# Patient Record
Sex: FEMALE | Race: OTHER | Employment: UNEMPLOYED | URBAN - METROPOLITAN AREA
[De-identification: names, ages, dates, MRNs, and addresses within clinical notes are randomized per-mention and may not be internally consistent; named-entity substitution may affect disease eponyms.]

---

## 2019-02-06 ENCOUNTER — OFFICE VISIT (OUTPATIENT)
Dept: FAMILY MEDICINE CLINIC | Age: 49
End: 2019-02-06
Payer: COMMERCIAL

## 2019-02-06 VITALS
OXYGEN SATURATION: 98 % | BODY MASS INDEX: 28.28 KG/M2 | DIASTOLIC BLOOD PRESSURE: 86 MMHG | WEIGHT: 180.2 LBS | RESPIRATION RATE: 15 BRPM | HEART RATE: 72 BPM | HEIGHT: 67 IN | SYSTOLIC BLOOD PRESSURE: 138 MMHG | TEMPERATURE: 97.4 F

## 2019-02-06 DIAGNOSIS — Z90.49 HISTORY OF COLECTOMY: ICD-10-CM

## 2019-02-06 DIAGNOSIS — Z13.0 SCREENING FOR BLOOD DISEASE: ICD-10-CM

## 2019-02-06 DIAGNOSIS — M54.2 CHRONIC NECK PAIN: ICD-10-CM

## 2019-02-06 DIAGNOSIS — G89.29 CHRONIC LOW BACK PAIN, UNSPECIFIED BACK PAIN LATERALITY, WITH SCIATICA PRESENCE UNSPECIFIED: ICD-10-CM

## 2019-02-06 DIAGNOSIS — G89.29 CHRONIC NECK PAIN: ICD-10-CM

## 2019-02-06 DIAGNOSIS — I10 ESSENTIAL HYPERTENSION: Primary | ICD-10-CM

## 2019-02-06 DIAGNOSIS — M54.5 CHRONIC LOW BACK PAIN, UNSPECIFIED BACK PAIN LATERALITY, WITH SCIATICA PRESENCE UNSPECIFIED: ICD-10-CM

## 2019-02-06 DIAGNOSIS — Z85.038 H/O COLON CANCER, STAGE II: ICD-10-CM

## 2019-02-06 DIAGNOSIS — G47.30 SLEEP APNEA, UNSPECIFIED TYPE: ICD-10-CM

## 2019-02-06 LAB
ALBUMIN SERPL-MCNC: 4.4 G/DL (ref 3.9–4.9)
ALP BLD-CCNC: 68 U/L (ref 40–130)
ALT SERPL-CCNC: 11 U/L (ref 0–33)
ANION GAP SERPL CALCULATED.3IONS-SCNC: 14 MEQ/L (ref 7–13)
AST SERPL-CCNC: 15 U/L (ref 0–35)
BASOPHILS ABSOLUTE: 0.1 K/UL (ref 0–0.2)
BASOPHILS RELATIVE PERCENT: 0.8 %
BILIRUB SERPL-MCNC: 0.7 MG/DL (ref 0–1.2)
BUN BLDV-MCNC: 9 MG/DL (ref 6–20)
CALCIUM SERPL-MCNC: 9.5 MG/DL (ref 8.6–10.2)
CEA: 1.8 NG/ML (ref 0–5.5)
CHLORIDE BLD-SCNC: 103 MEQ/L (ref 98–107)
CHOLESTEROL, TOTAL: 166 MG/DL (ref 0–199)
CO2: 23 MEQ/L (ref 22–29)
CREAT SERPL-MCNC: 0.43 MG/DL (ref 0.5–0.9)
EOSINOPHILS ABSOLUTE: 0.2 K/UL (ref 0–0.7)
EOSINOPHILS RELATIVE PERCENT: 2.2 %
GFR AFRICAN AMERICAN: >60
GFR NON-AFRICAN AMERICAN: >60
GLOBULIN: 3 G/DL (ref 2.3–3.5)
GLUCOSE BLD-MCNC: 93 MG/DL (ref 74–109)
HCT VFR BLD CALC: 39.7 % (ref 37–47)
HDLC SERPL-MCNC: 53 MG/DL (ref 40–59)
HEMOGLOBIN: 13.6 G/DL (ref 12–16)
LDL CHOLESTEROL CALCULATED: 98 MG/DL (ref 0–129)
LYMPHOCYTES ABSOLUTE: 2.5 K/UL (ref 1–4.8)
LYMPHOCYTES RELATIVE PERCENT: 32 %
MCH RBC QN AUTO: 30.6 PG (ref 27–31.3)
MCHC RBC AUTO-ENTMCNC: 34.4 % (ref 33–37)
MCV RBC AUTO: 89 FL (ref 82–100)
MONOCYTES ABSOLUTE: 0.6 K/UL (ref 0.2–0.8)
MONOCYTES RELATIVE PERCENT: 7.4 %
NEUTROPHILS ABSOLUTE: 4.4 K/UL (ref 1.4–6.5)
NEUTROPHILS RELATIVE PERCENT: 57.6 %
PDW BLD-RTO: 13.5 % (ref 11.5–14.5)
PLATELET # BLD: 280 K/UL (ref 130–400)
POTASSIUM SERPL-SCNC: 4.2 MEQ/L (ref 3.5–5.1)
RBC # BLD: 4.46 M/UL (ref 4.2–5.4)
SODIUM BLD-SCNC: 140 MEQ/L (ref 132–144)
TOTAL PROTEIN: 7.4 G/DL (ref 6.4–8.1)
TRIGL SERPL-MCNC: 76 MG/DL (ref 0–200)
TSH REFLEX: 0.75 UIU/ML (ref 0.27–4.2)
VITAMIN D 25-HYDROXY: 36 NG/ML (ref 30–100)
WBC # BLD: 7.7 K/UL (ref 4.8–10.8)

## 2019-02-06 PROCEDURE — 99204 OFFICE O/P NEW MOD 45 MIN: CPT | Performed by: INTERNAL MEDICINE

## 2019-02-06 RX ORDER — AMLODIPINE BESYLATE 2.5 MG/1
2.5 TABLET ORAL DAILY
Qty: 30 TABLET | Refills: 0 | Status: SHIPPED | OUTPATIENT
Start: 2019-02-06 | End: 2019-02-14 | Stop reason: DRUGHIGH

## 2019-02-06 ASSESSMENT — PATIENT HEALTH QUESTIONNAIRE - PHQ9
SUM OF ALL RESPONSES TO PHQ9 QUESTIONS 1 & 2: 0
SUM OF ALL RESPONSES TO PHQ QUESTIONS 1-9: 0
SUM OF ALL RESPONSES TO PHQ QUESTIONS 1-9: 0
1. LITTLE INTEREST OR PLEASURE IN DOING THINGS: 0
2. FEELING DOWN, DEPRESSED OR HOPELESS: 0

## 2019-02-06 ASSESSMENT — ENCOUNTER SYMPTOMS
EYE PAIN: 0
SHORTNESS OF BREATH: 0
BACK PAIN: 1
ABDOMINAL PAIN: 0

## 2019-02-11 DIAGNOSIS — G89.29 CHRONIC LOW BACK PAIN, UNSPECIFIED BACK PAIN LATERALITY, WITH SCIATICA PRESENCE UNSPECIFIED: Primary | ICD-10-CM

## 2019-02-11 DIAGNOSIS — M54.2 CHRONIC NECK PAIN: ICD-10-CM

## 2019-02-11 DIAGNOSIS — G89.29 CHRONIC NECK PAIN: ICD-10-CM

## 2019-02-11 DIAGNOSIS — M54.5 CHRONIC LOW BACK PAIN, UNSPECIFIED BACK PAIN LATERALITY, WITH SCIATICA PRESENCE UNSPECIFIED: Primary | ICD-10-CM

## 2019-02-13 ENCOUNTER — OFFICE VISIT (OUTPATIENT)
Dept: GASTROENTEROLOGY | Age: 49
End: 2019-02-13
Payer: COMMERCIAL

## 2019-02-13 VITALS
HEART RATE: 77 BPM | TEMPERATURE: 98 F | BODY MASS INDEX: 27.28 KG/M2 | SYSTOLIC BLOOD PRESSURE: 140 MMHG | HEIGHT: 68 IN | DIASTOLIC BLOOD PRESSURE: 74 MMHG | WEIGHT: 180 LBS | OXYGEN SATURATION: 99 %

## 2019-02-13 DIAGNOSIS — R13.19 ESOPHAGEAL DYSPHAGIA: Primary | ICD-10-CM

## 2019-02-13 DIAGNOSIS — Z85.038 HISTORY OF COLON CANCER: ICD-10-CM

## 2019-02-13 PROCEDURE — 99204 OFFICE O/P NEW MOD 45 MIN: CPT | Performed by: INTERNAL MEDICINE

## 2019-02-13 RX ORDER — SODIUM, POTASSIUM,MAG SULFATES 17.5-3.13G
SOLUTION, RECONSTITUTED, ORAL ORAL
Qty: 1 BOTTLE | Refills: 0 | Status: SHIPPED | OUTPATIENT
Start: 2019-02-13 | End: 2019-03-28 | Stop reason: ALTCHOICE

## 2019-02-13 RX ORDER — OMEPRAZOLE 40 MG/1
40 CAPSULE, DELAYED RELEASE ORAL DAILY
Qty: 30 CAPSULE | Refills: 3 | Status: SHIPPED
Start: 2019-02-13 | End: 2020-04-02 | Stop reason: CLARIF

## 2019-02-14 ENCOUNTER — OFFICE VISIT (OUTPATIENT)
Dept: FAMILY MEDICINE CLINIC | Age: 49
End: 2019-02-14
Payer: COMMERCIAL

## 2019-02-14 VITALS
DIASTOLIC BLOOD PRESSURE: 84 MMHG | BODY MASS INDEX: 28.91 KG/M2 | TEMPERATURE: 97.1 F | RESPIRATION RATE: 15 BRPM | OXYGEN SATURATION: 98 % | HEIGHT: 67 IN | HEART RATE: 78 BPM | SYSTOLIC BLOOD PRESSURE: 144 MMHG | WEIGHT: 184.2 LBS

## 2019-02-14 DIAGNOSIS — M50.90 CERVICAL DISC DISEASE: ICD-10-CM

## 2019-02-14 DIAGNOSIS — G89.29 CHRONIC LOW BACK PAIN, UNSPECIFIED BACK PAIN LATERALITY, WITH SCIATICA PRESENCE UNSPECIFIED: Primary | ICD-10-CM

## 2019-02-14 DIAGNOSIS — G89.29 CHRONIC LOW BACK PAIN, UNSPECIFIED BACK PAIN LATERALITY, WITH SCIATICA PRESENCE UNSPECIFIED: ICD-10-CM

## 2019-02-14 DIAGNOSIS — K22.4 ESOPHAGEAL DYSMOTILITY: ICD-10-CM

## 2019-02-14 DIAGNOSIS — H90.5 SENSORINEURAL HEARING LOSS (SNHL) OF RIGHT EAR, UNSPECIFIED HEARING STATUS ON CONTRALATERAL SIDE: ICD-10-CM

## 2019-02-14 DIAGNOSIS — M54.5 CHRONIC LOW BACK PAIN, UNSPECIFIED BACK PAIN LATERALITY, WITH SCIATICA PRESENCE UNSPECIFIED: Primary | ICD-10-CM

## 2019-02-14 DIAGNOSIS — Z85.038 HISTORY OF COLON CANCER: ICD-10-CM

## 2019-02-14 DIAGNOSIS — I10 ESSENTIAL HYPERTENSION: ICD-10-CM

## 2019-02-14 DIAGNOSIS — M54.5 CHRONIC LOW BACK PAIN, UNSPECIFIED BACK PAIN LATERALITY, WITH SCIATICA PRESENCE UNSPECIFIED: ICD-10-CM

## 2019-02-14 PROCEDURE — 99214 OFFICE O/P EST MOD 30 MIN: CPT | Performed by: INTERNAL MEDICINE

## 2019-02-14 RX ORDER — NABUMETONE 500 MG/1
500 TABLET, FILM COATED ORAL 2 TIMES DAILY
Qty: 60 TABLET | Refills: 0 | Status: SHIPPED
Start: 2019-02-14 | End: 2020-04-02 | Stop reason: CLARIF

## 2019-02-14 RX ORDER — NABUMETONE 500 MG/1
500 TABLET, FILM COATED ORAL 2 TIMES DAILY
Qty: 60 TABLET | Refills: 0 | Status: SHIPPED | OUTPATIENT
Start: 2019-02-14 | End: 2019-02-14 | Stop reason: SDUPTHER

## 2019-02-14 RX ORDER — FLUTICASONE PROPIONATE 50 MCG
1 SPRAY, SUSPENSION (ML) NASAL DAILY
Qty: 1 BOTTLE | Refills: 0 | Status: SHIPPED | OUTPATIENT
Start: 2019-02-14 | End: 2019-02-14 | Stop reason: SDUPTHER

## 2019-02-14 RX ORDER — AMLODIPINE BESYLATE 10 MG/1
10 TABLET ORAL DAILY
Qty: 14 TABLET | Refills: 0 | Status: SHIPPED | OUTPATIENT
Start: 2019-02-14 | End: 2019-02-14 | Stop reason: SDUPTHER

## 2019-02-14 RX ORDER — AMLODIPINE BESYLATE 10 MG/1
10 TABLET ORAL DAILY
Qty: 14 TABLET | Refills: 0 | Status: SHIPPED | OUTPATIENT
Start: 2019-02-14 | End: 2019-02-20 | Stop reason: SDUPTHER

## 2019-02-14 RX ORDER — BACLOFEN 10 MG/1
10 TABLET ORAL 2 TIMES DAILY
Qty: 60 TABLET | Refills: 0 | Status: SHIPPED | OUTPATIENT
Start: 2019-02-14 | End: 2019-02-14 | Stop reason: SDUPTHER

## 2019-02-14 RX ORDER — FLUTICASONE PROPIONATE 50 MCG
1 SPRAY, SUSPENSION (ML) NASAL DAILY
Qty: 1 BOTTLE | Refills: 0 | Status: SHIPPED | OUTPATIENT
Start: 2019-02-14 | End: 2020-08-06 | Stop reason: CLARIF

## 2019-02-14 RX ORDER — DOXYCYCLINE HYCLATE 100 MG
100 TABLET ORAL 2 TIMES DAILY
Qty: 14 TABLET | Refills: 0 | Status: SHIPPED | OUTPATIENT
Start: 2019-02-14 | End: 2019-02-14 | Stop reason: SDUPTHER

## 2019-02-14 RX ORDER — BACLOFEN 10 MG/1
10 TABLET ORAL 2 TIMES DAILY
Qty: 60 TABLET | Refills: 0 | Status: SHIPPED | OUTPATIENT
Start: 2019-02-14 | End: 2019-05-31 | Stop reason: SDUPTHER

## 2019-02-14 RX ORDER — DOXYCYCLINE HYCLATE 100 MG
100 TABLET ORAL 2 TIMES DAILY
Qty: 14 TABLET | Refills: 0 | Status: SHIPPED | OUTPATIENT
Start: 2019-02-14 | End: 2019-02-21

## 2019-02-14 ASSESSMENT — ENCOUNTER SYMPTOMS
SINUS PRESSURE: 1
SINUS PAIN: 1
ABDOMINAL PAIN: 0
BACK PAIN: 0
EYE PAIN: 0
SHORTNESS OF BREATH: 0

## 2019-02-15 ENCOUNTER — HOSPITAL ENCOUNTER (OUTPATIENT)
Dept: PHYSICAL THERAPY | Age: 49
Setting detail: THERAPIES SERIES
Discharge: HOME | End: 2019-02-15

## 2019-02-15 PROCEDURE — 97162 PT EVAL MOD COMPLEX 30 MIN: CPT

## 2019-02-15 ASSESSMENT — PAIN DESCRIPTION - FREQUENCY: FREQUENCY: CONTINUOUS

## 2019-02-15 ASSESSMENT — PAIN DESCRIPTION - PAIN TYPE: TYPE: CHRONIC PAIN

## 2019-02-15 ASSESSMENT — PAIN SCALES - GENERAL: PAINLEVEL_OUTOF10: 9

## 2019-02-15 ASSESSMENT — PAIN DESCRIPTION - ORIENTATION: ORIENTATION: LOWER;UPPER

## 2019-02-15 ASSESSMENT — PAIN DESCRIPTION - DESCRIPTORS: DESCRIPTORS: ACHING;BURNING;STABBING

## 2019-02-15 ASSESSMENT — PAIN DESCRIPTION - LOCATION: LOCATION: BACK

## 2019-02-15 ASSESSMENT — PAIN DESCRIPTION - ONSET: ONSET: AWAKENED FROM SLEEP

## 2019-02-15 ASSESSMENT — PAIN - FUNCTIONAL ASSESSMENT: PAIN_FUNCTIONAL_ASSESSMENT: PREVENTS OR INTERFERES WITH ALL ACTIVE AND SOME PASSIVE ACTIVITIES

## 2019-02-18 ENCOUNTER — NURSE ONLY (OUTPATIENT)
Dept: FAMILY MEDICINE CLINIC | Age: 49
End: 2019-02-18
Payer: COMMERCIAL

## 2019-02-18 VITALS — SYSTOLIC BLOOD PRESSURE: 148 MMHG | DIASTOLIC BLOOD PRESSURE: 82 MMHG

## 2019-02-18 DIAGNOSIS — I10 ESSENTIAL HYPERTENSION, BENIGN: Primary | ICD-10-CM

## 2019-02-18 PROCEDURE — 99211 OFF/OP EST MAY X REQ PHY/QHP: CPT | Performed by: INTERNAL MEDICINE

## 2019-02-19 PROBLEM — M47.812 CERVICAL SPONDYLOSIS WITHOUT MYELOPATHY: Status: ACTIVE | Noted: 2019-02-19

## 2019-02-19 PROBLEM — G56.03 CARPAL TUNNEL SYNDROME ON BOTH SIDES: Status: ACTIVE | Noted: 2019-02-19

## 2019-02-20 ENCOUNTER — OFFICE VISIT (OUTPATIENT)
Dept: FAMILY MEDICINE CLINIC | Age: 49
End: 2019-02-20
Payer: COMMERCIAL

## 2019-02-20 ENCOUNTER — HOSPITAL ENCOUNTER (OUTPATIENT)
Dept: PHYSICAL THERAPY | Age: 49
Setting detail: THERAPIES SERIES
Discharge: HOME | End: 2019-02-20

## 2019-02-20 VITALS
TEMPERATURE: 98.5 F | BODY MASS INDEX: 27.43 KG/M2 | SYSTOLIC BLOOD PRESSURE: 136 MMHG | HEIGHT: 68 IN | HEART RATE: 85 BPM | RESPIRATION RATE: 16 BRPM | DIASTOLIC BLOOD PRESSURE: 70 MMHG | OXYGEN SATURATION: 97 % | WEIGHT: 181 LBS

## 2019-02-20 DIAGNOSIS — M54.9 MID BACK PAIN: ICD-10-CM

## 2019-02-20 DIAGNOSIS — M54.5 LOW BACK PAIN, UNSPECIFIED BACK PAIN LATERALITY, UNSPECIFIED CHRONICITY, WITH SCIATICA PRESENCE UNSPECIFIED: ICD-10-CM

## 2019-02-20 DIAGNOSIS — I10 ESSENTIAL HYPERTENSION: ICD-10-CM

## 2019-02-20 DIAGNOSIS — M54.2 NECK PAIN: Primary | ICD-10-CM

## 2019-02-20 PROCEDURE — 99214 OFFICE O/P EST MOD 30 MIN: CPT | Performed by: INTERNAL MEDICINE

## 2019-02-20 RX ORDER — AMLODIPINE BESYLATE 10 MG/1
10 TABLET ORAL DAILY
Qty: 90 TABLET | Refills: 0 | Status: SHIPPED | OUTPATIENT
Start: 2019-02-20 | End: 2019-05-25 | Stop reason: SDUPTHER

## 2019-02-20 ASSESSMENT — ENCOUNTER SYMPTOMS
EYE PAIN: 0
ABDOMINAL PAIN: 0
BACK PAIN: 1
SHORTNESS OF BREATH: 0

## 2019-02-22 ENCOUNTER — OFFICE VISIT (OUTPATIENT)
Dept: FAMILY MEDICINE CLINIC | Age: 49
End: 2019-02-22
Payer: COMMERCIAL

## 2019-02-22 VITALS
OXYGEN SATURATION: 99 % | RESPIRATION RATE: 15 BRPM | TEMPERATURE: 98.2 F | WEIGHT: 181.6 LBS | HEART RATE: 89 BPM | DIASTOLIC BLOOD PRESSURE: 78 MMHG | HEIGHT: 68 IN | SYSTOLIC BLOOD PRESSURE: 136 MMHG | BODY MASS INDEX: 27.52 KG/M2

## 2019-02-22 DIAGNOSIS — I10 ESSENTIAL HYPERTENSION: Primary | ICD-10-CM

## 2019-02-22 PROCEDURE — 99213 OFFICE O/P EST LOW 20 MIN: CPT | Performed by: INTERNAL MEDICINE

## 2019-02-22 RX ORDER — KETOROLAC TROMETHAMINE 10 MG/1
10 TABLET, FILM COATED ORAL PRN
COMMUNITY
Start: 2019-02-15 | End: 2019-03-28 | Stop reason: ALTCHOICE

## 2019-02-22 RX ORDER — LISINOPRIL 5 MG/1
2.5 TABLET ORAL DAILY
Qty: 15 TABLET | Refills: 1 | Status: SHIPPED | OUTPATIENT
Start: 2019-02-22 | End: 2019-03-21 | Stop reason: DRUGHIGH

## 2019-02-22 ASSESSMENT — ENCOUNTER SYMPTOMS
ABDOMINAL PAIN: 0
EYE PAIN: 0
SHORTNESS OF BREATH: 0
BACK PAIN: 0

## 2019-02-28 ENCOUNTER — HOSPITAL ENCOUNTER (OUTPATIENT)
Dept: MRI IMAGING | Age: 49
Discharge: HOME | End: 2019-03-02
Payer: COMMERCIAL

## 2019-02-28 DIAGNOSIS — M54.5 LOW BACK PAIN, UNSPECIFIED BACK PAIN LATERALITY, UNSPECIFIED CHRONICITY, WITH SCIATICA PRESENCE UNSPECIFIED: ICD-10-CM

## 2019-02-28 DIAGNOSIS — M54.2 NECK PAIN: ICD-10-CM

## 2019-02-28 DIAGNOSIS — M54.9 MID BACK PAIN: ICD-10-CM

## 2019-02-28 PROCEDURE — A9579 GAD-BASE MR CONTRAST NOS,1ML: HCPCS | Performed by: INTERNAL MEDICINE

## 2019-02-28 PROCEDURE — 72158 MRI LUMBAR SPINE W/O & W/DYE: CPT

## 2019-02-28 PROCEDURE — 6360000004 HC RX CONTRAST MEDICATION: Performed by: INTERNAL MEDICINE

## 2019-02-28 PROCEDURE — 72156 MRI NECK SPINE W/O & W/DYE: CPT

## 2019-02-28 PROCEDURE — 72157 MRI CHEST SPINE W/O & W/DYE: CPT

## 2019-02-28 RX ORDER — SODIUM CHLORIDE 0.9 % (FLUSH) 0.9 %
10 SYRINGE (ML) INJECTION 2 TIMES DAILY
Status: DISCONTINUED | OUTPATIENT
Start: 2019-02-28 | End: 2019-03-03 | Stop reason: HOSPADM

## 2019-02-28 RX ADMIN — GADOTERIDOL 15 ML: 279.3 INJECTION, SOLUTION INTRAVENOUS at 17:50

## 2019-03-01 ENCOUNTER — NURSE ONLY (OUTPATIENT)
Dept: FAMILY MEDICINE CLINIC | Age: 49
End: 2019-03-01

## 2019-03-01 ENCOUNTER — HOSPITAL ENCOUNTER (OUTPATIENT)
Dept: NUCLEAR MEDICINE | Age: 49
Discharge: HOME | End: 2019-03-03
Payer: COMMERCIAL

## 2019-03-01 VITALS — SYSTOLIC BLOOD PRESSURE: 138 MMHG | DIASTOLIC BLOOD PRESSURE: 64 MMHG

## 2019-03-01 DIAGNOSIS — Z01.30 BLOOD PRESSURE CHECK: Primary | ICD-10-CM

## 2019-03-01 DIAGNOSIS — M54.2 NECK PAIN: ICD-10-CM

## 2019-03-01 DIAGNOSIS — M54.40 ACUTE BILATERAL LOW BACK PAIN WITH SCIATICA, SCIATICA LATERALITY UNSPECIFIED: ICD-10-CM

## 2019-03-01 PROCEDURE — A9503 TC99M MEDRONATE: HCPCS | Performed by: INTERNAL MEDICINE

## 2019-03-01 PROCEDURE — 3430000000 HC RX DIAGNOSTIC RADIOPHARMACEUTICAL: Performed by: INTERNAL MEDICINE

## 2019-03-01 PROCEDURE — 78306 BONE IMAGING WHOLE BODY: CPT

## 2019-03-01 PROCEDURE — 99999 PR OFFICE/OUTPT VISIT,PROCEDURE ONLY: CPT | Performed by: INTERNAL MEDICINE

## 2019-03-01 RX ORDER — TC 99M MEDRONATE 20 MG/10ML
25 INJECTION, POWDER, LYOPHILIZED, FOR SOLUTION INTRAVENOUS
Status: COMPLETED | OUTPATIENT
Start: 2019-03-01 | End: 2019-03-01

## 2019-03-01 RX ADMIN — TC 99M MEDRONATE 25.7 MILLICURIE: 20 INJECTION, POWDER, LYOPHILIZED, FOR SOLUTION INTRAVENOUS at 10:00

## 2019-03-04 ENCOUNTER — OUTSIDE SERVICES (OUTPATIENT)
Dept: GASTROENTEROLOGY | Age: 49
End: 2019-03-04
Payer: COMMERCIAL

## 2019-03-04 DIAGNOSIS — Z85.038 HISTORY OF COLON CANCER: ICD-10-CM

## 2019-03-04 DIAGNOSIS — R13.19 ESOPHAGEAL DYSPHAGIA: Primary | ICD-10-CM

## 2019-03-04 PROCEDURE — 43248 EGD GUIDE WIRE INSERTION: CPT | Performed by: INTERNAL MEDICINE

## 2019-03-04 PROCEDURE — 45380 COLONOSCOPY AND BIOPSY: CPT | Performed by: INTERNAL MEDICINE

## 2019-03-04 PROCEDURE — 45385 COLONOSCOPY W/LESION REMOVAL: CPT | Performed by: INTERNAL MEDICINE

## 2019-03-05 ENCOUNTER — HOSPITAL ENCOUNTER (OUTPATIENT)
Dept: PHYSICAL THERAPY | Age: 49
Setting detail: THERAPIES SERIES
Discharge: HOME | End: 2019-03-05
Payer: COMMERCIAL

## 2019-03-05 PROCEDURE — 97161 PT EVAL LOW COMPLEX 20 MIN: CPT

## 2019-03-05 PROCEDURE — 97110 THERAPEUTIC EXERCISES: CPT

## 2019-03-05 ASSESSMENT — PAIN DESCRIPTION - FREQUENCY: FREQUENCY: CONTINUOUS

## 2019-03-05 ASSESSMENT — PAIN DESCRIPTION - PAIN TYPE: TYPE: ACUTE PAIN

## 2019-03-05 ASSESSMENT — PAIN DESCRIPTION - ORIENTATION: ORIENTATION: POSTERIOR

## 2019-03-05 ASSESSMENT — PAIN SCALES - GENERAL: PAINLEVEL_OUTOF10: 7

## 2019-03-05 ASSESSMENT — PAIN DESCRIPTION - LOCATION: LOCATION: BACK;NECK;WRIST

## 2019-03-06 ENCOUNTER — HOSPITAL ENCOUNTER (OUTPATIENT)
Dept: PHYSICAL THERAPY | Age: 49
Setting detail: THERAPIES SERIES
Discharge: HOME | End: 2019-03-06
Payer: COMMERCIAL

## 2019-03-06 DIAGNOSIS — R93.7 ABNORMAL MRI, CERVICAL SPINE: ICD-10-CM

## 2019-03-06 DIAGNOSIS — G89.29 CHRONIC LOW BACK PAIN, UNSPECIFIED BACK PAIN LATERALITY, WITH SCIATICA PRESENCE UNSPECIFIED: Primary | ICD-10-CM

## 2019-03-06 DIAGNOSIS — M54.5 CHRONIC LOW BACK PAIN, UNSPECIFIED BACK PAIN LATERALITY, WITH SCIATICA PRESENCE UNSPECIFIED: Primary | ICD-10-CM

## 2019-03-08 ENCOUNTER — HOSPITAL ENCOUNTER (OUTPATIENT)
Dept: CT IMAGING | Age: 49
Discharge: HOME | End: 2019-03-10
Payer: COMMERCIAL

## 2019-03-08 VITALS
HEIGHT: 68 IN | BODY MASS INDEX: 27.13 KG/M2 | HEART RATE: 71 BPM | DIASTOLIC BLOOD PRESSURE: 82 MMHG | SYSTOLIC BLOOD PRESSURE: 131 MMHG | WEIGHT: 179 LBS | RESPIRATION RATE: 16 BRPM

## 2019-03-08 DIAGNOSIS — R07.89 OTHER CHEST PAIN: ICD-10-CM

## 2019-03-08 DIAGNOSIS — R10.9 RIGHT SIDED ABDOMINAL PAIN: ICD-10-CM

## 2019-03-08 DIAGNOSIS — Z85.038 HISTORY OF COLON CANCER: ICD-10-CM

## 2019-03-08 PROCEDURE — 2500000003 HC RX 250 WO HCPCS: Performed by: INTERNAL MEDICINE

## 2019-03-08 PROCEDURE — 6360000004 HC RX CONTRAST MEDICATION: Performed by: INTERNAL MEDICINE

## 2019-03-08 PROCEDURE — 74177 CT ABD & PELVIS W/CONTRAST: CPT

## 2019-03-08 PROCEDURE — 71275 CT ANGIOGRAPHY CHEST: CPT

## 2019-03-08 RX ORDER — SODIUM CHLORIDE 0.9 % (FLUSH) 0.9 %
10 SYRINGE (ML) INJECTION
Status: DISPENSED | OUTPATIENT
Start: 2019-03-08 | End: 2019-03-08

## 2019-03-08 RX ADMIN — BARIUM SULFATE 450 ML: 20 SUSPENSION ORAL at 15:42

## 2019-03-08 RX ADMIN — IOPAMIDOL 100 ML: 612 INJECTION, SOLUTION INTRAVENOUS at 15:42

## 2019-03-14 ENCOUNTER — HOSPITAL ENCOUNTER (OUTPATIENT)
Dept: PHYSICAL THERAPY | Age: 49
Setting detail: THERAPIES SERIES
Discharge: HOME | End: 2019-03-14
Payer: COMMERCIAL

## 2019-03-14 PROCEDURE — 97113 AQUATIC THERAPY/EXERCISES: CPT

## 2019-03-14 ASSESSMENT — PAIN SCALES - GENERAL: PAINLEVEL_OUTOF10: 7

## 2019-03-19 ENCOUNTER — HOSPITAL ENCOUNTER (OUTPATIENT)
Dept: PHYSICAL THERAPY | Age: 49
Setting detail: THERAPIES SERIES
Discharge: HOME | End: 2019-03-19
Payer: COMMERCIAL

## 2019-03-21 ENCOUNTER — TELEPHONE (OUTPATIENT)
Dept: FAMILY MEDICINE CLINIC | Age: 49
End: 2019-03-21

## 2019-03-21 ENCOUNTER — OFFICE VISIT (OUTPATIENT)
Dept: FAMILY MEDICINE CLINIC | Age: 49
End: 2019-03-21
Payer: COMMERCIAL

## 2019-03-21 VITALS
BODY MASS INDEX: 27.13 KG/M2 | TEMPERATURE: 98.9 F | HEIGHT: 68 IN | WEIGHT: 179 LBS | SYSTOLIC BLOOD PRESSURE: 136 MMHG | OXYGEN SATURATION: 99 % | DIASTOLIC BLOOD PRESSURE: 82 MMHG | HEART RATE: 98 BPM | RESPIRATION RATE: 15 BRPM

## 2019-03-21 DIAGNOSIS — M54.50 CHRONIC MIDLINE LOW BACK PAIN WITHOUT SCIATICA: ICD-10-CM

## 2019-03-21 DIAGNOSIS — I10 ESSENTIAL HYPERTENSION: Primary | ICD-10-CM

## 2019-03-21 DIAGNOSIS — M54.2 NECK PAIN: ICD-10-CM

## 2019-03-21 DIAGNOSIS — G89.29 CHRONIC MIDLINE LOW BACK PAIN WITHOUT SCIATICA: ICD-10-CM

## 2019-03-21 LAB — HBA1C MFR BLD: 5.7 %

## 2019-03-21 PROCEDURE — 99214 OFFICE O/P EST MOD 30 MIN: CPT | Performed by: INTERNAL MEDICINE

## 2019-03-21 PROCEDURE — 83036 HEMOGLOBIN GLYCOSYLATED A1C: CPT | Performed by: INTERNAL MEDICINE

## 2019-03-21 RX ORDER — LISINOPRIL 5 MG/1
5 TABLET ORAL DAILY
Qty: 90 TABLET | Refills: 0 | Status: SHIPPED | OUTPATIENT
Start: 2019-03-21 | End: 2020-07-02 | Stop reason: CLARIF

## 2019-03-21 ASSESSMENT — ENCOUNTER SYMPTOMS
BACK PAIN: 1
ABDOMINAL PAIN: 0
EYE PAIN: 0
SHORTNESS OF BREATH: 0

## 2019-03-25 ENCOUNTER — HOSPITAL ENCOUNTER (OUTPATIENT)
Dept: PHYSICAL THERAPY | Age: 49
Setting detail: THERAPIES SERIES
Discharge: HOME | End: 2019-03-25
Payer: COMMERCIAL

## 2019-03-25 PROCEDURE — 97113 AQUATIC THERAPY/EXERCISES: CPT

## 2019-03-25 ASSESSMENT — PAIN DESCRIPTION - PAIN TYPE: TYPE: ACUTE PAIN

## 2019-03-25 ASSESSMENT — PAIN DESCRIPTION - DESCRIPTORS: DESCRIPTORS: CONSTANT

## 2019-03-25 ASSESSMENT — PAIN SCALES - GENERAL: PAINLEVEL_OUTOF10: 6

## 2019-03-25 ASSESSMENT — PAIN DESCRIPTION - LOCATION: LOCATION: BACK

## 2019-03-25 ASSESSMENT — PAIN DESCRIPTION - ORIENTATION: ORIENTATION: UPPER

## 2019-03-28 ENCOUNTER — HOSPITAL ENCOUNTER (OUTPATIENT)
Dept: PHYSICAL THERAPY | Age: 49
Setting detail: THERAPIES SERIES
Discharge: HOME | End: 2019-03-28
Payer: COMMERCIAL

## 2019-03-28 PROCEDURE — 97113 AQUATIC THERAPY/EXERCISES: CPT

## 2019-03-28 ASSESSMENT — PAIN DESCRIPTION - LOCATION: LOCATION: NECK

## 2019-03-28 ASSESSMENT — PAIN DESCRIPTION - DESCRIPTORS: DESCRIPTORS: TIGHTNESS

## 2019-03-28 ASSESSMENT — PAIN SCALES - GENERAL: PAINLEVEL_OUTOF10: 6

## 2019-03-28 ASSESSMENT — PAIN DESCRIPTION - PAIN TYPE: TYPE: ACUTE PAIN

## 2019-04-02 ENCOUNTER — HOSPITAL ENCOUNTER (OUTPATIENT)
Dept: PHYSICAL THERAPY | Age: 49
Setting detail: THERAPIES SERIES
Discharge: HOME | End: 2019-04-02
Payer: COMMERCIAL

## 2019-04-02 PROCEDURE — 97113 AQUATIC THERAPY/EXERCISES: CPT

## 2019-04-02 ASSESSMENT — PAIN DESCRIPTION - LOCATION: LOCATION: BACK;NECK

## 2019-04-02 ASSESSMENT — PAIN DESCRIPTION - DESCRIPTORS: DESCRIPTORS: TIGHTNESS

## 2019-04-02 ASSESSMENT — PAIN DESCRIPTION - ORIENTATION: ORIENTATION: LOWER;UPPER

## 2019-04-02 ASSESSMENT — PAIN DESCRIPTION - PAIN TYPE: TYPE: ACUTE PAIN

## 2019-04-02 ASSESSMENT — PAIN SCALES - GENERAL: PAINLEVEL_OUTOF10: 6

## 2019-04-02 NOTE — PROGRESS NOTES
[address] [address]  Outpatient Physical Therapy    Treatment Note        Date: 2019  Patient: Adam Salvador  : 1970  ACCT #: [de-identified]  Referring Practitioner: Dr. Marie Gomez  Diagnosis: M54.2 - Neck pain, M54.5 - Low back pain, M54.9 Mid Back pain. Visit Information:  PT Visit Information  Onset Date: 19  PT Insurance Information: [employer]  Total # of Visits Approved: 8  Total # of Visits to Date: 5  Plan of Care/Certification Expiration Date: 19  No Show: 1  Canceled Appointment: 2  Progress Note Counter:     Subjective: Pt reports in the AM she is very stiff in the neck and shoulders, but gradually improves throughout the day. Pt states therapy has helped to Brú the pain and make it more tolerable,\" though not painfree yet. HEP Compliance:  X Good ? Fair ? Poor ? Reports not doing due to:    Vital Signs  Patient Currently in Pain: Yes   Pain Screening  Patient Currently in Pain: Yes  Pain Assessment  Pain Assessment: 0-10  Pain Level: 6  Pain Type: Acute pain  Pain Location: Back;Neck  Pain Orientation: Lower; Upper  Pain Descriptors: Tightness    OBJECTIVE:   Exercises  Exercise 9: AQUATIC:  Exercise 10: Gait drills: F/L/R x3 laps ea  Exercise 11: Sink ex, and hip circles cw/ccw x 18 ea  Exercise 12: Seated scap retractions, 5 sec x 15, shrugs/ rev rolls x 105ea  Exercise 13: Hamstring stretch 30s x 3 b/l  Exercise 14: barrel stretch 10''x5  Exercise 16: QL stretch standing 10''x5 b/l  Exercise 17: noodle hang x5 min     Strength: X NT  ? MMT completed:     ROM: X NT  ? ROM measurements:     *Indicates exercise, modality, or manual techniques to be initiated when appropriate    Assessment:    Body structures, Functions, Activity limitations: Decreased functional mobility , Decreased ROM, Decreased strength, Decreased balance, Increased Pain  Assessment: Initiated noodle ahng this date for improved stretching and dec pain w/ significant relief reported during and after exercise. Pt reports most ramsey in low and mid back during hip ext, VC's to keep range comfortable. Treatment Diagnosis: Neck, mid and lower back pain. Prognosis: Good     Goals:  Short term goals  Time Frame for Short term goals: 2  Short term goal 1: Instructed in HEP   Short term goal 2: Increase AROM to the lumbar, thoracic, cervical spine by 25% in all planes  Long term goals  Time Frame for Long term goals : 4  Long term goal 1: Increase Strength to the UE/LE to 4+/5 thorughout all planes. Long term goal 2: Decrease tenderness with palpation to cervical & lumbar paraspinals. Long term goal 3: Return to all ADLs pain free for functional activities. Progress toward goals: inc strength, rom, dec pain    POST-PAIN       Pain Rating (0-10 pain scale):   3/10   Location and pain description same as pre-treatment unless indicated. Action: ? NA   X Perform HEP  ? Meds as prescribed  ? Modalities as prescribed   ? Call Physician     Frequency/Duration:  Plan  Times per week: 2  Plan weeks: 4  Specific instructions for Next Treatment: Aquatics per Authoization 8 visits then transition to land based PT. Current Treatment Recommendations: Strengthening, ROM, Balance Training, Functional Mobility Training, Gait Training, Manual Therapy - Soft Tissue Mobilization, Neuromuscular Re-education, Aquatics  Plan Comment: Per Marielena Case PT, transfer pt care from [facility] PT to Marielena Case PT     Pt to continue current HEP. See objective section for any therapeutic exercise changes, additions or modifications this date.          PT Individual Minutes  Time In: 7443  Time Out: 4290  Minutes: 40  Timed Code Treatment Minutes: 40 Minutes  Procedure Minutes:0    Signature:  Electronically signed by Tildon Severin, PTA on 4/2/19 at 10:49 AM

## 2019-04-04 ENCOUNTER — HOSPITAL ENCOUNTER (OUTPATIENT)
Dept: PHYSICAL THERAPY | Age: 49
Setting detail: THERAPIES SERIES
Discharge: HOME | End: 2019-04-04
Payer: COMMERCIAL

## 2019-04-04 PROCEDURE — 97113 AQUATIC THERAPY/EXERCISES: CPT

## 2019-04-04 ASSESSMENT — PAIN DESCRIPTION - FREQUENCY: FREQUENCY: CONTINUOUS

## 2019-04-04 ASSESSMENT — PAIN DESCRIPTION - PAIN TYPE: TYPE: ACUTE PAIN

## 2019-04-04 ASSESSMENT — PAIN DESCRIPTION - DESCRIPTORS: DESCRIPTORS: TIGHTNESS

## 2019-04-04 ASSESSMENT — PAIN DESCRIPTION - ORIENTATION: ORIENTATION: LOWER

## 2019-04-04 ASSESSMENT — PAIN SCALES - GENERAL: PAINLEVEL_OUTOF10: 4

## 2019-04-04 ASSESSMENT — PAIN DESCRIPTION - LOCATION: LOCATION: BACK;NECK

## 2019-04-04 NOTE — PROGRESS NOTES
full session time. Treatment Diagnosis: Neck, mid and lower back pain. Prognosis: Good     Goals:  Short term goals  Time Frame for Short term goals: 2  Short term goal 1: Instructed in HEP   Short term goal 2: Increase AROM to the lumbar, thoracic, cervical spine by 25% in all planes  Long term goals  Time Frame for Long term goals : 4  Long term goal 1: Increase Strength to the UE/LE to 4+/5 thorughout all planes. Long term goal 2: Decrease tenderness with palpation to cervical & lumbar paraspinals. Long term goal 3: Return to all ADLs pain free for functional activities. Progress toward goals: UE/LE strength, lumbar ROM    POST-PAIN       Pain Rating (0-10 pain scale):  2 /10   Location and pain description same as pre-treatment unless indicated. Action: [] NA   [x] Perform HEP  [] Meds as prescribed  [] Modalities as prescribed   [] Call Physician     Frequency/Duration:  Plan  Times per week: 2  Plan weeks: 4  Specific instructions for Next Treatment: Aquatics per Authoization 8 visits then transition to land based PT. Current Treatment Recommendations: Strengthening, ROM, Balance Training, Functional Mobility Training, Gait Training, Manual Therapy - Soft Tissue Mobilization, Neuromuscular Re-education, Aquatics  Plan Comment: Per Nicole Han PT, transfer pt care from [facility] PT to Nicole Han PT     Pt to continue current HEP. See objective section for any therapeutic exercise changes, additions or modifications this date.     PT Individual Minutes  Time In: 3992  Time Out: 1463  Minutes: 40  Timed Code Treatment Minutes: 40 Minutes  Procedure Minutes: N/A    Signature:  Electronically signed by Lele Brown PTA on 4/4/19 at 11:29 AM

## 2019-04-16 ENCOUNTER — HOSPITAL ENCOUNTER (OUTPATIENT)
Dept: PHYSICAL THERAPY | Age: 49
Setting detail: THERAPIES SERIES
Discharge: HOME | End: 2019-04-16
Payer: COMMERCIAL

## 2019-04-16 NOTE — PROGRESS NOTES
[facility]       Physical Therapy  Cancellation/No-show Note  Patient Name:  Shy Pond  :  1970   Date:  2019  Referring Practitioner: Dr. Yenni Garcia  Diagnosis: M54.2 - Neck pain, M54.5 - Low back pain, M54.9 Mid Back pain. Visit Information:  PT Visit Information  Onset Date: 19  PT Insurance Information: [employer]  Total # of Visits Approved: 8  Total # of Visits to Date: 6  Plan of Care/Certification Expiration Date: 19  No Show: 2  Canceled Appointment: 3  Progress Note Counter:  NS 2019- patient rescheduled upon canceling.  D/C next visit if patient NS/cancel     For today's appointment patient:  [x]  Cancelled  []  Rescheduled appointment  []  No-show   []  Called pt to remind of next appointment     Reason given by patient:  []  Patient ill  []  Conflicting appointment  []  No transportation    []  Conflict with work  []  No reason given  []  Other:       Comments:       Signature: Electronically signed by Yessica Gloria PTA on 19 at 9:59 AM

## 2019-04-23 ENCOUNTER — HOSPITAL ENCOUNTER (OUTPATIENT)
Dept: PHYSICAL THERAPY | Age: 49
Setting detail: THERAPIES SERIES
Discharge: HOME | End: 2019-04-23
Payer: COMMERCIAL

## 2019-04-23 PROCEDURE — 97113 AQUATIC THERAPY/EXERCISES: CPT

## 2019-04-23 ASSESSMENT — PAIN DESCRIPTION - LOCATION: LOCATION: NECK;BACK

## 2019-04-23 ASSESSMENT — PAIN SCALES - GENERAL: PAINLEVEL_OUTOF10: 8

## 2019-04-23 ASSESSMENT — PAIN DESCRIPTION - DESCRIPTORS: DESCRIPTORS: TIGHTNESS

## 2019-04-23 NOTE — PROGRESS NOTES
[address]     Ph: [phone]  Fax: [phone]    [] Certification  [] Recertification []  Plan of Care  [x] Progress Note [] Discharge      To:  Dr. Yenni Garcia      From:  Rehan Narayanan, PT  Patient: Shy Pond     : 1970  Diagnosis: M54.2 - Neck pain, M54.5 - Low back pain, M54.9 Mid Back pain. Date: 2019  Treatment Diagnosis: Neck, mid and lower back pain. Plan of Care/Certification Expiration Date: 19  Progress Report Period from:  3/5/2019  to 2019    Total # of Visits to Date: 7   No Show: 2    Canceled Appointment: 3     OBJECTIVE:   Short Term Goals - Time Frame for Short term goals: 2    Goals Current/Discharge status  Met   Short term goal 1: Instructed in HEP   Pt reports compliance with HEP - notes decreased mm tension with stretching [x] yes  [] no   Short term goal 2: Increase AROM to the lumbar, thoracic, cervical spine by 25% in all planes  Spine  Cervical: Flexion - 45 degrees , Ext - 30  degrees , SB: 30 deg b/l , Rotation: 55 deg b/l  Lumbar: Flexion -  45 degrees , Ext - 15 degrees , SB: right - 35  , left -  30  deg    [x] yes  [x] no Lumbar SB B, Flex and cervical flex     Long Term Goals - Time Frame for Long term goals : 4  Goals Current/ Discharge status Met   Long term goal 1: Increase Strength to the UE/LE to 4+/5 thorughout all planes. Strength RLE  Comment: Hip 4/5, knee 4/5, DF 5/5  Strength LLE  Comment: Hip 4/5, knee 4+/5, DF 5/5  Strength RUE  Comment: 4 to 4+/5 throughout with pain  Strength LUE  Comment: 4 to 4+/5 throughout with pain      [] yes  [x] no R hip,knee, L hip, B UE   Long term goal 2: Decrease tenderness with palpation to cervical & lumbar paraspinals. Pt without sensitivity to palpation to paraspinals [x] yes  [] no   Long term goal 3: Return to all ADLs pain free for functional activities.   Pt self report 60-70% function rehabilitation services.     Physician Signature:__________________________________________________________  Date:  Please sign and return

## 2019-04-23 NOTE — PROGRESS NOTES
[address] [address]  Outpatient Physical Therapy    Treatment Note        Date: 2019  Patient: Aldo Stallings  : 1970  ACCT #: [de-identified]  Referring Practitioner: Dr. Joe Hernandes  Diagnosis: M54.2 - Neck pain, M54.5 - Low back pain, M54.9 Mid Back pain. Visit Information:  PT Visit Information  Onset Date: 19  PT Insurance Information: [employer]  Total # of Visits Approved: 8  Total # of Visits to Date: 7  Plan of Care/Certification Expiration Date: 19  No Show: 2  Canceled Appointment: 3  Progress Note Counter:     Subjective: Pt arriving 8 min late to tx this date. Pt notes decreased mm guarding since beginning therapy. Reports still has difficulty with prolonged standing. States sore today secondary to being in car with trip home from [place] yesterday. Self reports 60-70% function at this time.      HEP Compliance:  [x] Good [] Fair [] Poor [] Reports not doing due to:    Vital Signs  Patient Currently in Pain: Yes   Pain Screening  Patient Currently in Pain: Yes  Pain Assessment  Pain Assessment: 0-10  Pain Level: 8  Pain Location: Neck;Back  Pain Descriptors: Tightness    OBJECTIVE:   Exercises  Exercise 9: AQUATIC:  Exercise 10: Gait drills: F/L/R x3 laps  Exercise 11: Sink ex, and hip circles cw/ccw x 18 ea  Exercise 17: noodle bicycle x3 min and hang x5 min  Exercise 19: Objective measures    Strength: [] NT  [x] MMT completed:  Strength RLE  Comment: Hip 4/5, knee 4/5, DF 5/5  Strength LLE  Comment: Hip 4/5, knee 4+/5, DF 5/5  Strength RUE  Comment: 4 to 4+/5 throughout with pain  Strength LUE  Comment: 4 to 4+/5 throughout with pain     ROM: [] NT  [x] ROM measurements:  Spine  Cervical: Flexion - 45 degrees , Ext - 30  degrees , SB: 30 deg b/l , Rotation: 55 deg b/l  Lumbar: Flexion -  45 degrees , Ext - 15 degrees , SB: right - 35  , left -  30  deg    *Indicates exercise, modality, or manual techniques to be initiated when appropriate    Assessment: Body structures, Functions, Activity limitations: Decreased functional mobility , Decreased ROM, Decreased strength, Decreased balance, Increased Pain  Assessment: Pt self reports 60-70% function at this time with increased pain with prolonged standing or sitting while riding in car. States pain 8-9/10 at worst in last 5 days with above activities. Notes decreased mm guarding since beginning aquatic therapy. Pt with improved overall strength. Mildly improved ROM though with continued pain in all planes. Discussed attendance at length with pt and need to improve consistency to prevent DC. Pt states inconsistent attendance d/t illness and family emergencies, though pt verbalized understanding of need to improve consistency. Informed of potential DC if attendance not improved. Pt would benefit from continued therapy to address above deficits and improve tolerance to functional activities. Treatment Diagnosis: Neck, mid and lower back pain. Prognosis: Good     Goals:  Short term goals  Time Frame for Short term goals: 2  Short term goal 1: Instructed in HEP   Short term goal 2: Increase AROM to the lumbar, thoracic, cervical spine by 25% in all planes    Long term goals  Time Frame for Long term goals : 4  Long term goal 1: Increase Strength to the UE/LE to 4+/5 thorughout all planes. Long term goal 2: Decrease tenderness with palpation to cervical & lumbar paraspinals. Long term goal 3: Return to all ADLs pain free for functional activities. Progress toward goals: Tested for PN    POST-PAIN       Pain Rating (0-10 pain scale):  2 /10   Location and pain description same as pre-treatment unless indicated.    Action: [] NA   [x] Perform HEP  [] Meds as prescribed  [] Modalities as prescribed   [] Call Physician     Frequency/Duration:  Plan  Times per week: 2  Plan weeks: 4  Current Treatment Recommendations: Strengthening, ROM, Balance Training, Functional Mobility Training, Gait Training, Manual Therapy - Soft Tissue Mobilization, Neuromuscular Re-education, Aquatics  Plan Comment: Transfer pt care to Lyndsey Malave, PT - PN completed. Pt agreeable 1:1 land:aquatics     Pt to continue current HEP. See objective section for any therapeutic exercise changes, additions or modifications this date.          PT Individual Minutes  Time In: 1130  Time Out: 8971  Minutes: 38  Timed Code Treatment Minutes: 38 Minutes  Procedure Minutes: 0    Activity Minutes Units   Aquatics 38 3       Signature:  Electronically signed by Heath Sandifer, PTA on 4/23/19 at 11:59 AM

## 2019-05-14 ENCOUNTER — HOSPITAL ENCOUNTER (OUTPATIENT)
Dept: PHYSICAL THERAPY | Age: 49
Setting detail: THERAPIES SERIES
Discharge: HOME | End: 2019-05-14

## 2019-05-14 PROCEDURE — 97113 AQUATIC THERAPY/EXERCISES: CPT

## 2019-05-14 ASSESSMENT — PAIN SCALES - GENERAL: PAINLEVEL_OUTOF10: 8

## 2019-05-14 ASSESSMENT — PAIN DESCRIPTION - LOCATION: LOCATION: BACK;CHEST

## 2019-05-14 ASSESSMENT — PAIN DESCRIPTION - PAIN TYPE: TYPE: ACUTE PAIN

## 2019-05-14 ASSESSMENT — PAIN DESCRIPTION - DESCRIPTORS: DESCRIPTORS: TIGHTNESS;ACHING

## 2019-05-14 NOTE — PROGRESS NOTES
[address] [address]  Outpatient Physical Therapy    Treatment Note        Date: 2019  Patient: Tamiko Kelly  : 1970  ACCT #: [de-identified]  Referring Practitioner: Dr. Garrison Seek  Diagnosis: M54.2 - Neck pain, M54.5 - Low back pain, M54.9 Mid Back pain. Visit Information:  PT Visit Information  Onset Date: 19  PT Insurance Information: [employer]  Total # of Visits Approved: 8  Total # of Visits to Date: 8  Plan of Care/Certification Expiration Date: 19  No Show: 2  Canceled Appointment: 3  Progress Note Counter: + 8 additional from last PN    Subjective: Pt arriving 10 min late to tx this date. Was waiting upstairs for instructions. Pt states bad day 8/10 back and chest paindull tight. Comments: Pt arrived 10 mins late for appt  HEP Compliance:  [x] Good [] Fair [] Poor [] Reports not doing due to:    Vital Signs  Patient Currently in Pain: Yes   Pain Screening  Patient Currently in Pain: Yes  Pain Assessment  Pain Level: 8  Pain Type: Acute pain  Pain Location: Back; Chest  Pain Descriptors: Tightness; Aching    OBJECTIVE:   Exercises  Exercise 9: AQUATIC:  Exercise 10: Gait drills: F/L/R x3 laps  Exercise 11: Sink ex, and hip circles cw/ccw x 20 ea  Exercise 13: Hamstring stretch 30s x 3 b/l  Exercise 14: barrel stretch 10''x5  Exercise 15: post cap stretch 5 sec x 5, b/l        *Indicates exercise, modality, or manual techniques to be initiated when appropriate    Assessment: Body structures, Functions, Activity limitations: Decreased functional mobility , Decreased ROM, Decreased strength, Decreased balance, Increased Pain  Assessment: Increased sink and hip circles to improve functional strength. Pt required verbal cues for core posture with sink ex. Well motivated. Good tolerance to progressions and treatment. Treatment Diagnosis: Neck, mid and lower back pain.    Prognosis: Good  Patient Education: Continue with home exercises and increase as directed. Goals:  Short term goals  Time Frame for Short term goals: 2  Short term goal 1: Instructed in HEP   Short term goal 2: Increase AROM to the lumbar, thoracic, cervical spine by 25% in all planes    Long term goals  Time Frame for Long term goals : 4  Long term goal 1: Increase Strength to the UE/LE to 4+/5 thorughout all planes. Long term goal 2: Decrease tenderness with palpation to cervical & lumbar paraspinals. Long term goal 3: Return to all ADLs pain free for functional activities. Progress toward goals: LTG 1-3. POST-PAIN       Pain Rating (0-10 pain scale):  6 /10   Location and pain description same as pre-treatment unless indicated. Action: [] NA   [x] Perform HEP  [] Meds as prescribed  [] Modalities as prescribed   [] Call Physician     Frequency/Duration:  Plan  Times per week: 2  Plan weeks: 4  Current Treatment Recommendations: Strengthening, ROM, Balance Training, Functional Mobility Training, Gait Training, Manual Therapy - Soft Tissue Mobilization, Neuromuscular Re-education, Aquatics  Plan Comment: Transfer pt care to Raudel Liang PT - Pt agreeable 1:1 land:aquatics     Pt to continue current HEP. See objective section for any therapeutic exercise changes, additions or modifications this date.          PT Individual Minutes  Time In: 3259  Time Out: 1130  Minutes: 38  Timed Code Treatment Minutes: 40 Minutes  Procedure Minutes:NA    Signature:  Electronically signed by Christinia Angelucci, PTA on 5/14/19 at 11:55 AM

## 2019-05-20 ENCOUNTER — HOSPITAL ENCOUNTER (OUTPATIENT)
Dept: PHYSICAL THERAPY | Age: 49
Setting detail: THERAPIES SERIES
Discharge: HOME | End: 2019-05-20

## 2019-05-24 ENCOUNTER — HOSPITAL ENCOUNTER (OUTPATIENT)
Dept: PHYSICAL THERAPY | Age: 49
Setting detail: THERAPIES SERIES
Discharge: HOME | End: 2019-05-24

## 2019-05-24 PROCEDURE — 97113 AQUATIC THERAPY/EXERCISES: CPT

## 2019-05-24 ASSESSMENT — PAIN DESCRIPTION - ORIENTATION: ORIENTATION: LOWER;UPPER

## 2019-05-24 ASSESSMENT — PAIN DESCRIPTION - DESCRIPTORS: DESCRIPTORS: TIGHTNESS;ACHING

## 2019-05-24 ASSESSMENT — PAIN SCALES - GENERAL: PAINLEVEL_OUTOF10: 8

## 2019-05-24 ASSESSMENT — PAIN DESCRIPTION - LOCATION: LOCATION: BACK

## 2019-05-24 NOTE — PROGRESS NOTES
[address] [address]  Outpatient Physical Therapy    Treatment Note        Date: 2019  Patient: Adam Ruiz  : 1970  ACCT #: [de-identified]  Referring Practitioner: Dr. Ambika Alves  Diagnosis: M54.2 - Neck pain, M54.5 - Low back pain, M54.9 Mid Back pain. Visit Information:  PT Visit Information  Onset Date: 19  PT Insurance Information: [employer]  Total # of Visits Approved: 8  Total # of Visits to Date: 9  Plan of Care/Certification Expiration Date: 19  No Show: 2  Progress Note Counter:     Subjective: Pt reported 40 minutes late for appt. Clinic able to accomodate. States was very sore after last session for 2 days. Comments: Discussed attendance and that she will be discharged if NS or CX again. HEP Compliance:  [x] Good [] Fair [] Poor [] Reports not doing due to:    Vital Signs  Patient Currently in Pain: Yes   Pain Screening  Patient Currently in Pain: Yes  Pain Assessment  Pain Level: 8  Pain Location: Back  Pain Orientation: Lower; Upper  Pain Descriptors: Tightness; Aching    OBJECTIVE:   Exercises  Exercise 9: AQUATIC:  Exercise 10: Gait drills: F/L/R x laps  Exercise 11: Sink ex, and hip circles cw/ccw x 20 ea  Exercise 13: Hamstring stretch 30s x 3 b/l  Exercise 14: barrel stretch 10''x5  Exercise 15: post cap stretch 5 sec x 5, b/l  Exercise 16: QL stretch standing 10''x3 b/l  Exercise 17: Noodle hang x 5 minutes. *Indicates exercise, modality, or manual techniques to be initiated when appropriate    Assessment: Body structures, Functions, Activity limitations: Decreased functional mobility , Decreased ROM, Decreased strength, Decreased balance, Increased Pain  Assessment: No change in reps as pt expiriened a lot of soreness after last session. Verbal cues for core posture and not to exceed pain with stretches. Discussed need for punctuality   Treatment Diagnosis: Neck, mid and lower back pain.    Prognosis: Good  Patient Education: Continue with home exercises and increase as directed. Goals:  Short term goals  Time Frame for Short term goals: 2  Short term goal 1: Instructed in HEP   Short term goal 2: Increase AROM to the lumbar, thoracic, cervical spine by 25% in all planes    Long term goals  Time Frame for Long term goals : 4  Long term goal 1: Increase Strength to the UE/LE to 4+/5 thorughout all planes. Long term goal 2: Decrease tenderness with palpation to cervical & lumbar paraspinals. Long term goal 3: Return to all ADLs pain free for functional activities. Progress toward goals:LTG 1,2. POST-PAIN       Pain Rating (0-10 pain scale): 6  /10   Location and pain description same as pre-treatment unless indicated. Action: [] NA   [x] Perform HEP  [] Meds as prescribed  [] Modalities as prescribed   [] Call Physician     Frequency/Duration:  Plan  Times per week: 2  Plan weeks: 4  Current Treatment Recommendations: Strengthening, ROM, Balance Training, Functional Mobility Training, Gait Training, Manual Therapy - Soft Tissue Mobilization, Neuromuscular Re-education, Aquatics  Plan Comment: Transfer pt care to [facility], PT - Pt agreeable 1:1 land:aquatics     Pt to continue current HEP. See objective section for any therapeutic exercise changes, additions or modifications this date.          PT Individual Minutes  Time In: 9637  Time Out: 2917  Minutes: 40  Timed Code Treatment Minutes: 40 Minutes  Procedure Minutes:NA    Signature:  Electronically signed by Cyn Alfonso PTA on 5/24/19 at 2:11 PM

## 2019-05-25 DIAGNOSIS — I10 ESSENTIAL HYPERTENSION: ICD-10-CM

## 2019-05-28 RX ORDER — AMLODIPINE BESYLATE 10 MG/1
TABLET ORAL
Qty: 90 TABLET | Refills: 0 | Status: SHIPPED | OUTPATIENT
Start: 2019-05-28 | End: 2020-07-02 | Stop reason: DRUGHIGH

## 2019-05-31 DIAGNOSIS — M54.5 CHRONIC LOW BACK PAIN, UNSPECIFIED BACK PAIN LATERALITY, WITH SCIATICA PRESENCE UNSPECIFIED: ICD-10-CM

## 2019-05-31 DIAGNOSIS — G89.29 CHRONIC LOW BACK PAIN, UNSPECIFIED BACK PAIN LATERALITY, WITH SCIATICA PRESENCE UNSPECIFIED: ICD-10-CM

## 2019-05-31 DIAGNOSIS — M50.90 CERVICAL DISC DISEASE: ICD-10-CM

## 2019-06-02 RX ORDER — BACLOFEN 10 MG/1
TABLET ORAL
Qty: 60 TABLET | Refills: 2 | Status: SHIPPED | OUTPATIENT
Start: 2019-06-02 | End: 2020-07-13

## 2019-06-07 ENCOUNTER — CLINICAL DOCUMENTATION (OUTPATIENT)
Dept: PHYSICAL THERAPY | Age: 49
End: 2019-06-07

## 2019-06-07 NOTE — PROGRESS NOTES
[address]                                  Ph: [phone]  Fax: [phone]     [] Certification  [] Recertification  []  Plan of Care  [] Progress Note [x] Discharge                            To:  Dr. Sridhar Epps              From:  Ramila Montague, PT  Patient: Willie Hull     : 1970  Diagnosis: M54.2 - Neck pain, M54.5 - Low back pain, M54.9 Mid Back pain. Date: 2019  Treatment Diagnosis: Neck, mid and lower back pain.      Progress Report Period from:  3/5/2019  to 2019     Total # of Visits to Date: 9   No Show: 2    Canceled Appointment: 4      OBJECTIVE:   Short Term Goals - Time Frame for Short term goals: 2    Goals Current/Discharge status  Met   Short term goal 1: Instructed in HEP   HEP given [x] yes  [] no   Short term goal 2: Increase AROM to the lumbar, thoracic, cervical spine by 25% in all planes  Last PN [x] yes  [x] no Lumbar SB B, Flex and cervical flex      Long Term Goals - Time Frame for Long term goals : 4  Goals Current/ Discharge status Met   Long term goal 1: Increase Strength to the UE/LE to 4+/5 thorughout all planes. Per last PN  Strength RLE  Comment: Hip 4/5, knee 4/5, DF 5/5  Strength LLE  Comment: Hip 4/5, knee 4+/5, DF 5/5  Strength RUE  Comment: 4 to 4+/5 throughout with pain  Strength LUE  Comment: 4 to 4+/5 throughout with pain    [] yes  [x] no R hip,knee, L hip, B UE   Long term goal 2: Decrease tenderness with palpation to cervical & lumbar paraspinals. Unexpected DC [x] yes  [] no   Long term goal 3: Return to all ADLs pain free for functional activities. Unexpected DC NT [] yes  [x] no         Body structures, Functions, Activity limitations: Decreased functional mobility , Decreased ROM, Decreased strength, Decreased balance, Increased Pain  Assessment: Pt only seen twice since last PN over 1 month ago.   Unable to progress acordingly and pt advised of discharge. Prognosis: Good     PLAN: [x] DC PT due to lack of attendance  Frequency/Duration:                                        Patient Status:[] Continue/ Initiate plan of Care                          [x] Discharge PT. Recommend pt continue with HEP. [] Additional visits requested, Please re-certify for additional visits:                                                       Signature:   Electronically signed by Roger Epstein PT on 6/7/2019 at 3:48 PM       If you have any questions or concerns, please don't hesitate to call.   Thank you for your referral.     I have reviewed this plan of care and certify a need for medically necessary rehabilitation services.     Physician Signature:__________________________________________________________  Date:  Please sign and return

## 2020-04-02 ENCOUNTER — VIRTUAL VISIT (OUTPATIENT)
Dept: FAMILY MEDICINE CLINIC | Age: 50
End: 2020-04-02
Payer: COMMERCIAL

## 2020-04-02 ENCOUNTER — TELEPHONE (OUTPATIENT)
Dept: FAMILY MEDICINE CLINIC | Age: 50
End: 2020-04-02

## 2020-04-02 PROCEDURE — 99214 OFFICE O/P EST MOD 30 MIN: CPT | Performed by: INTERNAL MEDICINE

## 2020-04-02 RX ORDER — LISINOPRIL 5 MG/1
5 TABLET ORAL DAILY
Qty: 90 TABLET | Refills: 0 | Status: SHIPPED | OUTPATIENT
Start: 2020-04-02 | End: 2020-07-09 | Stop reason: SDUPTHER

## 2020-04-02 RX ORDER — AMLODIPINE BESYLATE 10 MG/1
10 TABLET ORAL DAILY
Qty: 90 TABLET | Refills: 0 | Status: SHIPPED | OUTPATIENT
Start: 2020-04-02 | End: 2020-07-02 | Stop reason: CLARIF

## 2020-04-02 RX ORDER — NABUMETONE 500 MG/1
500 TABLET, FILM COATED ORAL 2 TIMES DAILY PRN
Qty: 60 TABLET | Refills: 1 | Status: SHIPPED | OUTPATIENT
Start: 2020-04-02 | End: 2020-08-31 | Stop reason: SDUPTHER

## 2020-04-02 ASSESSMENT — ENCOUNTER SYMPTOMS
ABDOMINAL PAIN: 0
EYE PAIN: 0
SHORTNESS OF BREATH: 0
BACK PAIN: 0

## 2020-04-02 NOTE — PROGRESS NOTES
Relationship status: Not on file    Intimate partner violence     Fear of current or ex partner: Not on file     Emotionally abused: Not on file     Physically abused: Not on file     Forced sexual activity: Not on file   Other Topics Concern    Not on file   Social History Narrative    Not on file     Allergies   Allergen Reactions    Morphine Other (See Comments)     heart races    Tramadol Other (See Comments)     dizziness, lightheaded, nausea     Current Outpatient Medications on File Prior to Visit   Medication Sig Dispense Refill    baclofen (LIORESAL) 10 MG tablet Take one tablet po bid prn muscle pain or spasms 60 tablet 2    amLODIPine (NORVASC) 10 MG tablet TAKE 1 TABLET BY MOUTH ONCE DAILY 90 tablet 0    lisinopril (PRINIVIL;ZESTRIL) 5 MG tablet Take 1 tablet by mouth daily 90 tablet 0    fluticasone (FLONASE) 50 MCG/ACT nasal spray 1 spray by Nasal route daily 1 Bottle 0    magnesium (MAGNESIUM-OXIDE) 250 MG TABS tablet Take 250 mg by mouth daily      b complex vitamins capsule Take 1 capsule by mouth daily       No current facility-administered medications on file prior to visit. I have personally reviewed the ROS, PMH, PFH, and social history     Review of Systems   Constitutional: Negative for chills and fever. HENT: Negative for congestion. Eyes: Negative for pain. Respiratory: Negative for shortness of breath. Cardiovascular: Negative for chest pain. Gastrointestinal: Negative for abdominal pain. Genitourinary: Negative for hematuria. Musculoskeletal: Positive for neck pain. Negative for back pain. Allergic/Immunologic: Negative for immunocompromised state. Neurological: Negative for headaches. Psychiatric/Behavioral: Negative for hallucinations. Objective: There were no vitals taken for this visit. Physical Exam  Constitutional:       General: She is not in acute distress. Appearance: She is not ill-appearing or toxic-appearing.    Pulmonary: etc.   Telephone visit done because of coronavirus pandemic.   explained billeable visit, he understands and agrees to continue  Video visit done because of coronavirus pandemic. Visit done via doxy. me   I was at home and patient was at home during this visit. Patient was offered pregnancy test, she declined, she understands risks of birth defects. SHe refused influenza vaccine. Understands rare but fatal complications. She describes vague palpitations she was told to make an appointment quickly for ekg and heart monitor or go to ER  Risk of stroke, etc explained.    She's asx right now      Orders Placed This Encounter       Orders Placed This Encounter   Procedures    PIEDAD DIGITAL SCREEN W CAD BILATERAL     Standing Status:   Future     Standing Expiration Date:   6/2/2021     Order Specific Question:   Reason for exam:     Answer:   screening    TSH with Reflex     Standing Status:   Future     Standing Expiration Date:   4/2/2021    Vitamin D 25 Hydroxy     Standing Status:   Future     Standing Expiration Date:   4/2/2021    CBC Auto Differential     Standing Status:   Future     Standing Expiration Date:   4/2/2021    Comprehensive Metabolic Panel     Standing Status:   Future     Standing Expiration Date:   4/2/2021    Hemoglobin A1C     Standing Status:   Future     Standing Expiration Date:   4/2/2021    Lipid Panel     Standing Status:   Future     Standing Expiration Date:   4/2/2021     Order Specific Question:   Is Patient Fasting?/# of Hours     Answer:   10    Magnesium     Standing Status:   Future     Standing Expiration Date:   4/2/2021    Ambulatory referral to Obstetrics / Gynecology     Referral Priority:   Routine     Referral Type:   Eval and Treat     Referral Reason:   Specialty Services Required     Referred to Provider:   Ryland Ha DO     Requested Specialty:   Obstetrics & Gynecology     Number of Visits Requested:   1     Orders Placed This Encounter   Medications

## 2020-04-08 ENCOUNTER — TELEPHONE (OUTPATIENT)
Dept: FAMILY MEDICINE CLINIC | Age: 50
End: 2020-04-08

## 2020-04-13 ENCOUNTER — OFFICE VISIT (OUTPATIENT)
Dept: PRIMARY CARE CLINIC | Age: 50
End: 2020-04-13
Payer: COMMERCIAL

## 2020-04-13 ENCOUNTER — NURSE TRIAGE (OUTPATIENT)
Dept: OTHER | Facility: CLINIC | Age: 50
End: 2020-04-13

## 2020-04-13 VITALS
DIASTOLIC BLOOD PRESSURE: 76 MMHG | WEIGHT: 184 LBS | TEMPERATURE: 98.5 F | BODY MASS INDEX: 27.89 KG/M2 | HEIGHT: 68 IN | HEART RATE: 76 BPM | SYSTOLIC BLOOD PRESSURE: 128 MMHG | RESPIRATION RATE: 16 BRPM | OXYGEN SATURATION: 99 %

## 2020-04-13 DIAGNOSIS — R05.9 COUGH: ICD-10-CM

## 2020-04-13 LAB
INFLUENZA A ANTIBODY: NORMAL
INFLUENZA B ANTIBODY: NORMAL
S PYO AG THROAT QL: NORMAL

## 2020-04-13 PROCEDURE — 99214 OFFICE O/P EST MOD 30 MIN: CPT | Performed by: NURSE PRACTITIONER

## 2020-04-13 PROCEDURE — 87880 STREP A ASSAY W/OPTIC: CPT | Performed by: NURSE PRACTITIONER

## 2020-04-13 PROCEDURE — 87804 INFLUENZA ASSAY W/OPTIC: CPT | Performed by: NURSE PRACTITIONER

## 2020-04-13 RX ORDER — AMOXICILLIN AND CLAVULANATE POTASSIUM 875; 125 MG/1; MG/1
1 TABLET, FILM COATED ORAL 2 TIMES DAILY
Qty: 14 TABLET | Refills: 0 | Status: SHIPPED | OUTPATIENT
Start: 2020-04-13 | End: 2020-04-20

## 2020-04-13 RX ORDER — METHYLPREDNISOLONE 4 MG/1
TABLET ORAL
Qty: 1 KIT | Refills: 0 | Status: SHIPPED | OUTPATIENT
Start: 2020-04-13 | End: 2020-07-02 | Stop reason: CLARIF

## 2020-04-13 ASSESSMENT — VISUAL ACUITY: OU: 1

## 2020-04-13 NOTE — TELEPHONE ENCOUNTER
Call received from [facility]      Reason for Disposition   SEVERE coughing spells (e.g., whooping sound after coughing, vomiting after coughing)    Answer Assessment - Initial Assessment Questions  1. ONSET: \"When did the cough begin? \"       Cough started during quarantine due to  having exposure to Covid + patient while at work. Cough is getting worse and she feels like she will vomit at times due to cough. Bringing up some white secretions at times. 2. SEVERITY: \"How bad is the cough today? \"       Cough comes and goes - gets worse at night time - hard time lying down due to cough and cough gets worse with cold drinks. Has urinary incontinence with cough. 3. RESPIRATORY DISTRESS: \"Describe your breathing. \"       Breathing normally at this time - denies SOB- deep breathing causes coughing    4. FEVER: \"Do you have a fever? \" If so, ask: \"What is your temperature, how was it measured, and when did it start? \"      Denies    5. HEMOPTYSIS: \"Are you coughing up any blood? \" If so ask: \"How much? \" (flecks, streaks, tablespoons, etc.)      Denies    6. TREATMENT: \"What have you done so far to treat the cough? \" (e.g., meds, fluids, humidifier)      Theraflu, Zyrtec    7. CARDIAC HISTORY: \"Do you have any history of heart disease? \" (e.g., heart attack, congestive heart failure)       Denies    8. LUNG HISTORY: \"Do you have any history of lung disease? \"  (e.g., pulmonary embolus, asthma, emphysema)      Denies SOB    9. PE RISK FACTORS: \"Do you have a history of blood clots? \" (or: recent major surgery, recent prolonged travel, bedridden)      Denies    10. OTHER SYMPTOMS: \"Do you have any other symptoms? (e.g., runny nose, wheezing, chest pain)        Very itchy throat, gums are sensitive, thick saliva, mouth is swollen towards the back - buccal regions feel swollen on both sides. Neck feels swollen. Able to eat and drink but it is bothersome. Was unable to open/close mouth completely yesterday.   Has been

## 2020-04-15 LAB
SARS-COV-2: NOT DETECTED
SOURCE: NORMAL

## 2020-06-17 ENCOUNTER — OFFICE VISIT (OUTPATIENT)
Dept: OBGYN CLINIC | Age: 50
End: 2020-06-17
Payer: COMMERCIAL

## 2020-06-17 VITALS
HEIGHT: 68 IN | SYSTOLIC BLOOD PRESSURE: 118 MMHG | BODY MASS INDEX: 28.13 KG/M2 | WEIGHT: 185.6 LBS | DIASTOLIC BLOOD PRESSURE: 70 MMHG

## 2020-06-17 DIAGNOSIS — I10 ESSENTIAL HYPERTENSION: ICD-10-CM

## 2020-06-17 DIAGNOSIS — Z12.73 SCREENING FOR OVARIAN CANCER: ICD-10-CM

## 2020-06-17 DIAGNOSIS — K21.00 GASTROESOPHAGEAL REFLUX DISEASE WITH ESOPHAGITIS: ICD-10-CM

## 2020-06-17 DIAGNOSIS — M54.2 CERVICAL PAIN: ICD-10-CM

## 2020-06-17 DIAGNOSIS — Z12.11 SCREENING FOR COLON CANCER: ICD-10-CM

## 2020-06-17 DIAGNOSIS — Z12.39 SCREENING FOR BREAST CANCER: ICD-10-CM

## 2020-06-17 DIAGNOSIS — Z12.4 SCREENING FOR CERVICAL CANCER: ICD-10-CM

## 2020-06-17 LAB
ALBUMIN SERPL-MCNC: 4.3 G/DL (ref 3.5–4.6)
ALP BLD-CCNC: 72 U/L (ref 40–130)
ALT SERPL-CCNC: 15 U/L (ref 0–33)
ANION GAP SERPL CALCULATED.3IONS-SCNC: 9 MEQ/L (ref 9–15)
AST SERPL-CCNC: 14 U/L (ref 0–35)
BASOPHILS ABSOLUTE: 0 K/UL (ref 0–0.2)
BASOPHILS RELATIVE PERCENT: 0.5 %
BILIRUB SERPL-MCNC: 0.8 MG/DL (ref 0.2–0.7)
BUN BLDV-MCNC: 9 MG/DL (ref 6–20)
CALCIUM SERPL-MCNC: 9.2 MG/DL (ref 8.5–9.9)
CHLORIDE BLD-SCNC: 104 MEQ/L (ref 95–107)
CHOLESTEROL, TOTAL: 167 MG/DL (ref 0–199)
CO2: 25 MEQ/L (ref 20–31)
CREAT SERPL-MCNC: 0.41 MG/DL (ref 0.5–0.9)
EOSINOPHILS ABSOLUTE: 0.2 K/UL (ref 0–0.7)
EOSINOPHILS RELATIVE PERCENT: 2.5 %
GFR AFRICAN AMERICAN: >60
GFR NON-AFRICAN AMERICAN: >60
GLOBULIN: 2.9 G/DL (ref 2.3–3.5)
GLUCOSE BLD-MCNC: 103 MG/DL (ref 70–99)
HBA1C MFR BLD: 5.6 % (ref 4.8–5.9)
HCT VFR BLD CALC: 39.1 % (ref 37–47)
HDLC SERPL-MCNC: 53 MG/DL (ref 40–59)
HEMOGLOBIN: 13.3 G/DL (ref 12–16)
LDL CHOLESTEROL CALCULATED: 101 MG/DL (ref 0–129)
LYMPHOCYTES ABSOLUTE: 2.3 K/UL (ref 1–4.8)
LYMPHOCYTES RELATIVE PERCENT: 36.1 %
MAGNESIUM: 2 MG/DL (ref 1.7–2.4)
MCH RBC QN AUTO: 30.4 PG (ref 27–31.3)
MCHC RBC AUTO-ENTMCNC: 34.1 % (ref 33–37)
MCV RBC AUTO: 89.2 FL (ref 82–100)
MONOCYTES ABSOLUTE: 0.5 K/UL (ref 0.2–0.8)
MONOCYTES RELATIVE PERCENT: 8.6 %
NEUTROPHILS ABSOLUTE: 3.4 K/UL (ref 1.4–6.5)
NEUTROPHILS RELATIVE PERCENT: 52.3 %
PDW BLD-RTO: 13.4 % (ref 11.5–14.5)
PLATELET # BLD: 277 K/UL (ref 130–400)
POTASSIUM SERPL-SCNC: 3.9 MEQ/L (ref 3.4–4.9)
RBC # BLD: 4.39 M/UL (ref 4.2–5.4)
SODIUM BLD-SCNC: 138 MEQ/L (ref 135–144)
TOTAL PROTEIN: 7.2 G/DL (ref 6.3–8)
TRIGL SERPL-MCNC: 67 MG/DL (ref 0–150)
TSH REFLEX: 0.57 UIU/ML (ref 0.44–3.86)
VITAMIN D 25-HYDROXY: 18.8 NG/ML (ref 30–100)
WBC # BLD: 6.4 K/UL (ref 4.8–10.8)

## 2020-06-17 PROCEDURE — 99386 PREV VISIT NEW AGE 40-64: CPT | Performed by: OBSTETRICS & GYNECOLOGY

## 2020-06-17 ASSESSMENT — ENCOUNTER SYMPTOMS
COUGH: 0
BLOOD IN STOOL: 0
ABDOMINAL PAIN: 0
ABDOMINAL DISTENTION: 0
SORE THROAT: 0
SHORTNESS OF BREATH: 0
DIARRHEA: 0
VOMITING: 0
NAUSEA: 0
WHEEZING: 0
CONSTIPATION: 0

## 2020-06-17 ASSESSMENT — PATIENT HEALTH QUESTIONNAIRE - PHQ9
SUM OF ALL RESPONSES TO PHQ QUESTIONS 1-9: 0
SUM OF ALL RESPONSES TO PHQ9 QUESTIONS 1 & 2: 0
2. FEELING DOWN, DEPRESSED OR HOPELESS: 0
SUM OF ALL RESPONSES TO PHQ QUESTIONS 1-9: 0
1. LITTLE INTEREST OR PLEASURE IN DOING THINGS: 0

## 2020-06-17 NOTE — PROGRESS NOTES
Substance and Sexual Activity    Alcohol use: No    Drug use: No    Sexual activity: Yes     Partners: Male     Comment:     Lifestyle    Physical activity     Days per week: Not on file     Minutes per session: Not on file    Stress: Not on file   Relationships    Social connections     Talks on phone: Not on file     Gets together: Not on file     Attends Jehovah's witness service: Not on file     Active member of club or organization: Not on file     Attends meetings of clubs or organizations: Not on file     Relationship status: Not on file    Intimate partner violence     Fear of current or ex partner: Not on file     Emotionally abused: Not on file     Physically abused: Not on file     Forced sexual activity: Not on file   Other Topics Concern    Not on file   Social History Narrative    Not on file       Last mammogram 2020  Breast cancer risk factors : family hx of colon CA with personal history of colon cancer  Last Pap remote past     No LMP recorded. Patient has had a hysterectomy. Age at menopause onset: unusre  Menopausal symptom assessment: none  Urinary incontinence & GUsymptoms: none  Sexual dysfunction: none  Present Hormonal medications: none    Osteoporosis risk assessment : menopause  Last bone mineral density : n/a    History of abnormal lipids:yes -   Hypertension yes  Stroke/MI no    Yearly flu vaccine recommended for persons aged 48 and older. Colonoscopynot indicated    Review of Systems  Review of Systems   Constitutional: Negative for activity change, appetite change, fatigue and unexpected weight change. HENT: Negative for nosebleeds and sore throat. Eyes: Negative for visual disturbance. Respiratory: Negative for cough, shortness of breath and wheezing. Cardiovascular: Negative for chest pain, palpitations and leg swelling. Gastrointestinal: Negative for abdominal distention, abdominal pain, blood in stool, constipation, diarrhea, nausea and vomiting. Endocrine: Negative for cold intolerance, heat intolerance, polydipsia and polyuria. Genitourinary: Negative for difficulty urinating, dyspareunia, dysuria, frequency, genital sores, hematuria, pelvic pain, urgency, vaginal bleeding, vaginal discharge and vaginal pain. Musculoskeletal: Negative for arthralgias. Skin: Negative for rash. Neurological: Negative for dizziness, weakness, light-headedness and headaches. Hematological: Negative for adenopathy. Does not bruise/bleed easily. Psychiatric/Behavioral: Negative for agitation, confusion, dysphoric mood and sleep disturbance. All other systems reviewed and are negative. Objective:     Vitals:  /70   Ht 5' 8\" (1.727 m)   Wt 185 lb 9.6 oz (84.2 kg)   BMI 28.22 kg/m²     Physical Exam  Physical Exam  Constitutional:       General: She is not in acute distress. Appearance: She is well-developed. She is not diaphoretic. HENT:      Head: Normocephalic. Nose: Nose normal.   Eyes:      Conjunctiva/sclera: Conjunctivae normal.      Pupils: Pupils are equal, round, and reactive to light. Neck:      Thyroid: No thyromegaly. Trachea: No tracheal deviation. Cardiovascular:      Rate and Rhythm: Normal rate and regular rhythm. Heart sounds: Normal heart sounds. No murmur. No friction rub. No gallop. Pulmonary:      Effort: Pulmonary effort is normal. No respiratory distress. Breath sounds: Normal breath sounds. No wheezing or rales. Chest:      Chest wall: No tenderness. Abdominal:      General: Bowel sounds are normal. There is no distension. Palpations: Abdomen is soft. There is no mass. Tenderness: There is no abdominal tenderness. There is no guarding or rebound. Genitourinary:     Labia:         Right: No rash, tenderness or lesion. Left: No rash, tenderness or lesion. Vagina: Normal. No vaginal discharge, erythema, tenderness, bleeding or prolapsed vaginal walls.       Adnexa: Right: No mass, tenderness or fullness. Left: No mass, tenderness or fullness. Comments: vagina is not prolapsed and there is not a cystocele or rectocele noted  Musculoskeletal:      Right lower leg: No edema. Left lower leg: No edema. Skin:     General: Skin is warm and dry. Neurological:      Mental Status: She is alert and oriented to person, place, and time. Cranial Nerves: No cranial nerve deficit. Deep Tendon Reflexes: Reflexes normal.   Psychiatric:         Behavior: Behavior normal.         Judgment: Judgment normal.         Assessment:      Diagnosis Orders   1. Women's annual routine gynecological examination     2. S/P [facility]         Body mass index is 28.22 kg/m². Obesity:  Normal weight  Smoking:  No    Plan:   Pap : not-indicated hysterectomy cervix removed    kegel exercising recommended for bladder control and frequent urination bladder training   Obesity Counseling:  N/A  Smoking Counseling:  N/A    No orders of the defined types were placed in this encounter. No orders of the defined types were placed in this encounter. Follow up:  No follow-ups on file. Mika Search, DO    HEALTH MAINTENANCE:   Health information given. Women's Health patient information and counselling done. Counseled regarding risk/benefits/alternatives to Hormone Therapyand need for yearly re-evaluation. Periodic Pap smear discussed. Mammogram recommended yearly. Colon cancer screening by age 48 & every 5-10 years. Bone mineral density (by age 72 or sooner if indication itwould affect Hormone Therapy management or other risk factors for osteoporosis).

## 2020-07-02 ENCOUNTER — HOSPITAL ENCOUNTER (OUTPATIENT)
Dept: WOMENS IMAGING | Age: 50
Discharge: HOME | End: 2020-07-04
Payer: COMMERCIAL

## 2020-07-02 ENCOUNTER — VIRTUAL VISIT (OUTPATIENT)
Dept: FAMILY MEDICINE CLINIC | Age: 50
End: 2020-07-02
Payer: COMMERCIAL

## 2020-07-02 ENCOUNTER — TELEPHONE (OUTPATIENT)
Dept: FAMILY MEDICINE CLINIC | Age: 50
End: 2020-07-02

## 2020-07-02 PROCEDURE — 99214 OFFICE O/P EST MOD 30 MIN: CPT | Performed by: INTERNAL MEDICINE

## 2020-07-02 PROCEDURE — 77063 BREAST TOMOSYNTHESIS BI: CPT

## 2020-07-02 RX ORDER — FEXOFENADINE HCL 180 MG/1
180 TABLET ORAL DAILY
Qty: 30 TABLET | Refills: 0 | Status: SHIPPED | OUTPATIENT
Start: 2020-07-02 | End: 2020-08-01

## 2020-07-02 RX ORDER — AMLODIPINE BESYLATE 5 MG/1
5 TABLET ORAL DAILY
Qty: 30 TABLET | Refills: 0 | Status: SHIPPED | OUTPATIENT
Start: 2020-07-02 | End: 2020-08-06 | Stop reason: SDUPTHER

## 2020-07-02 RX ORDER — ERGOCALCIFEROL 1.25 MG/1
50000 CAPSULE ORAL WEEKLY
Qty: 6 CAPSULE | Refills: 0 | Status: SHIPPED | OUTPATIENT
Start: 2020-07-02 | End: 2020-08-06 | Stop reason: CLARIF

## 2020-07-02 ASSESSMENT — ENCOUNTER SYMPTOMS
BACK PAIN: 0
ABDOMINAL PAIN: 0
SORE THROAT: 0
EYE PAIN: 0
SHORTNESS OF BREATH: 0

## 2020-07-03 NOTE — PATIENT INSTRUCTIONS

## 2020-07-07 NOTE — TELEPHONE ENCOUNTER
I called and talked to the sleep lab and they tried 9 times to schedule this patient for her home sleep study, and she never returned there calls.

## 2020-07-09 ENCOUNTER — TELEPHONE (OUTPATIENT)
Dept: FAMILY MEDICINE CLINIC | Age: 50
End: 2020-07-09

## 2020-07-09 ENCOUNTER — NURSE ONLY (OUTPATIENT)
Dept: FAMILY MEDICINE CLINIC | Age: 50
End: 2020-07-09

## 2020-07-09 VITALS — SYSTOLIC BLOOD PRESSURE: 140 MMHG | DIASTOLIC BLOOD PRESSURE: 82 MMHG

## 2020-07-09 RX ORDER — LISINOPRIL 5 MG/1
5 TABLET ORAL DAILY
Qty: 30 TABLET | Refills: 0 | Status: SHIPPED | OUTPATIENT
Start: 2020-07-09 | End: 2020-11-18

## 2020-07-09 NOTE — TELEPHONE ENCOUNTER
Spoke to pt yes but has not taken any today she forgot.  The other day it was 160/79 with taking the medication

## 2020-07-09 NOTE — TELEPHONE ENCOUNTER
Her bp was much lower on 06/17/2020  Recommend complete bp log, checking three times daily for one week and then calling in values  Call same day if back to back values are over 150/90.

## 2020-07-10 RX ORDER — BLOOD PRESSURE TEST KIT
KIT MISCELLANEOUS
Qty: 1 KIT | Refills: 0 | Status: SHIPPED | OUTPATIENT
Start: 2020-07-10 | End: 2022-05-26 | Stop reason: CLARIF

## 2020-07-13 RX ORDER — BACLOFEN 10 MG/1
TABLET ORAL
Qty: 60 TABLET | Refills: 0 | Status: SHIPPED | OUTPATIENT
Start: 2020-07-13 | End: 2020-11-03 | Stop reason: SDUPTHER

## 2020-07-31 ENCOUNTER — TELEPHONE (OUTPATIENT)
Dept: ADMINISTRATIVE | Age: 50
End: 2020-07-31

## 2020-08-04 ENCOUNTER — NURSE TRIAGE (OUTPATIENT)
Dept: OTHER | Facility: CLINIC | Age: 50
End: 2020-08-04

## 2020-08-04 ENCOUNTER — TELEPHONE (OUTPATIENT)
Dept: ADMINISTRATIVE | Age: 50
End: 2020-08-04

## 2020-08-04 NOTE — TELEPHONE ENCOUNTER
PER [facility] - NURSE TRIAGE See within 3 days for persistent cough x 6 mos. VV ok. Nurse thinks there is cardiac issues or some sort of resp. disease psc/jlw    SCHEDULED FOR VV ON 8/6.

## 2020-08-06 ENCOUNTER — TELEPHONE (OUTPATIENT)
Dept: FAMILY MEDICINE CLINIC | Age: 50
End: 2020-08-06

## 2020-08-06 ENCOUNTER — VIRTUAL VISIT (OUTPATIENT)
Dept: FAMILY MEDICINE CLINIC | Age: 50
End: 2020-08-06
Payer: COMMERCIAL

## 2020-08-06 PROCEDURE — 99443 PR PHYS/QHP TELEPHONE EVALUATION 21-30 MIN: CPT | Performed by: INTERNAL MEDICINE

## 2020-08-06 RX ORDER — AMLODIPINE BESYLATE 5 MG/1
5 TABLET ORAL DAILY
Qty: 30 TABLET | Refills: 0 | Status: SHIPPED | OUTPATIENT
Start: 2020-08-06 | End: 2020-09-18 | Stop reason: SDUPTHER

## 2020-08-06 RX ORDER — ALBUTEROL SULFATE 90 UG/1
1 AEROSOL, METERED RESPIRATORY (INHALATION) EVERY 6 HOURS PRN
Qty: 1 INHALER | Refills: 0 | Status: SHIPPED | OUTPATIENT
Start: 2020-08-06 | End: 2021-05-11 | Stop reason: CLARIF

## 2020-08-06 RX ORDER — FAMOTIDINE 20 MG/1
20 TABLET, FILM COATED ORAL 2 TIMES DAILY
Qty: 60 TABLET | Refills: 1 | Status: SHIPPED | OUTPATIENT
Start: 2020-08-06 | End: 2020-11-23 | Stop reason: ALTCHOICE

## 2020-08-06 ASSESSMENT — ENCOUNTER SYMPTOMS
EYE PAIN: 0
ABDOMINAL PAIN: 0
COUGH: 1
BACK PAIN: 0
SHORTNESS OF BREATH: 0

## 2020-08-06 NOTE — PROGRESS NOTES
Subjective:      Patient ID: Isidra Cruz is a 47 y.o. female who presents today with:  No chief complaint on file. HPI     Sick visit for cough today  \"ticking sensation is setting off her cough\"   Where her tonsils are at  Makes her nose runny. Allegra 180 mg once daily \"calmed it a little but not much else\"  Dry cough  Total duration is several months in duration. Denies fevers or chills. Denies sore throat. She mentions David Chatters feels like her heart is slowing down\"  Denies syncope.    No chest pain   Doesn't feel light headed or dizzy (when she was on both amlodipine or lisinopril)   Past Medical History:   Diagnosis Date    Cancer ([facility] [address]     colon    Chronic back pain     GERD (gastroesophageal reflux disease)     Hypertension     Neck pain     Osteoarthritis     Sleep apnea      Past Surgical History:   Procedure Laterality Date    COLONOSCOPY      HYSTERECTOMY, TOTAL ABDOMINAL       Social History     Socioeconomic History    Marital status:      Spouse name: Not on file    Number of children: Not on file    Years of education: Not on file    Highest education level: Not on file   Occupational History    Not on file   Social Needs    Financial resource strain: Not on file    Food insecurity     Worry: Not on file     Inability: Not on file    Transportation needs     Medical: Not on file     Non-medical: Not on file   Tobacco Use    Smoking status: Never Smoker    Smokeless tobacco: Never Used   Substance and Sexual Activity    Alcohol use: No    Drug use: No    Sexual activity: Yes     Partners: Male     Comment:     Lifestyle    Physical activity     Days per week: Not on file     Minutes per session: Not on file    Stress: Not on file   Relationships    Social connections     Talks on phone: Not on file     Gets together: Not on file     Attends Yarsani service: Not on file     Active member of club or organization: Not on file     Attends meetings of clubs or organizations: Not on file     Relationship status: Not on file    Intimate partner violence     Fear of current or ex partner: Not on file     Emotionally abused: Not on file     Physically abused: Not on file     Forced sexual activity: Not on file   Other Topics Concern    Not on file   Social History Narrative    Not on file     Allergies   Allergen Reactions    Morphine Other (See Comments)     heart races    Tramadol Other (See Comments)     dizziness, lightheaded, nausea     Current Outpatient Medications on File Prior to Visit   Medication Sig Dispense Refill    baclofen (LIORESAL) 10 MG tablet TAKE 1 TABLET BY MOUTH TWICE DAILY AS NEEDED MUSCLE  PAIN  OR  SPASMS (Hold for sedation, no driving or operating machinery while using these) 60 tablet 0    Blood Pressure KIT Dx: HTN 1 kit 0    lisinopril (PRINIVIL;ZESTRIL) 5 MG tablet Take 1 tablet by mouth daily 30 tablet 0    vitamin D (ERGOCALCIFEROL) 1.25 MG (73112 UT) CAPS capsule Take 1 capsule by mouth once a week 6 capsule 0    amLODIPine (NORVASC) 5 MG tablet Take 1 tablet by mouth daily 30 tablet 0    nabumetone (RELAFEN) 500 MG tablet Take 1 tablet by mouth 2 times daily as needed for Pain 60 tablet 1    fluticasone (FLONASE) 50 MCG/ACT nasal spray 1 spray by Nasal route daily 1 Bottle 0    magnesium (MAGNESIUM-OXIDE) 250 MG TABS tablet Take 250 mg by mouth daily      b complex vitamins capsule Take 1 capsule by mouth daily       No current facility-administered medications on file prior to visit. I have personally reviewed the ROS, PMH, PFH, and social history     Review of Systems   Constitutional: Negative for chills and fever. HENT: Negative for congestion. Eyes: Negative for pain. Respiratory: Positive for cough. Negative for shortness of breath. Cardiovascular: Negative for chest pain. Gastrointestinal: Negative for abdominal pain. Genitourinary: Negative for hematuria.    Musculoskeletal: Negative for back pain. Allergic/Immunologic: Negative for immunocompromised state. Neurological: Negative for headaches. Psychiatric/Behavioral: Negative for hallucinations. Objective: There were no vitals taken for this visit. Physical Exam    Unable to perform given telephone visit. Assessment:       Diagnosis Orders   1. Chronic cough  famotidine (PEPCID) 20 MG tablet    albuterol sulfate HFA (VENTOLIN HFA) 108 (90 Base) MCG/ACT inhaler    XR CHEST (2 VW)   2. Palpitations           Plan:   Telephone visit done because of coronavirus pandemic. Time spent 21 minutes   explained billeable visit, patient understands and agrees to continue  I was at home and patient was at home during this visit. Failed allegra  Trial of pepcid. Get cxr  F/u after   She just ran out of amlodipine, restart, needs bp check per below. She will come in asap for a bp check. MA visit in 5 to 7 days for bp check. Call in 2 to 3 days if you don't hear from me after the bp check. Questionable palpitations? Get holter  Call 911 should you get new, worsening, progressive symptoms. (or cp, dyspnea, etc. )  Risk of mi  Keep your [facility] visit or schedule if you don't have one. Patient was offered pregnancy test, she declined, she understands risks of birth defects. Orders Placed This Encounter   Procedures    XR CHEST (2 VW)     Standing Status:   Future     Standing Expiration Date:   8/6/2021     Orders Placed This Encounter   Medications    famotidine (PEPCID) 20 MG tablet     Sig: Take 1 tablet by mouth 2 times daily     Dispense:  60 tablet     Refill:  1    albuterol sulfate HFA (VENTOLIN HFA) 108 (90 Base) MCG/ACT inhaler     Sig: Inhale 1 puff into the lungs every 6 hours as needed for Wheezing or Shortness of Breath     Dispense:  1 Inhaler     Refill:  0     If anything should change or worsen call ASAP, don't wait for next scheduled appointment. No follow-ups on file.       Germaine Kaye MD

## 2020-08-06 NOTE — PATIENT INSTRUCTIONS
Patient Education        Cough: Care Instructions  Your Care Instructions     A cough is your body's response to something that bothers your throat or airways. Many things can cause a cough. You might cough because of a cold or the flu, bronchitis, or asthma. Smoking, postnasal drip, allergies, and stomach acid that backs up into your throat also can cause coughs. A cough is a symptom, not a disease. Most coughs stop when the cause, such as a cold, goes away. You can take a few steps at home to cough less and feel better. Follow-up care is a key part of your treatment and safety. Be sure to make and go to all appointments, and call your doctor if you are having problems. It's also a good idea to know your test results and keep a list of the medicines you take. How can you care for yourself at home? · Drink lots of water and other fluids. This helps thin the mucus and soothes a dry or sore throat. Honey or lemon juice in hot water or tea may ease a dry cough. · Take cough medicine as directed by your doctor. · Prop up your head on pillows to help you breathe and ease a dry cough. · Try cough drops to soothe a dry or sore throat. Cough drops don't stop a cough. Medicine-flavored cough drops are no better than candy-flavored drops or hard candy. · Do not smoke. Avoid secondhand smoke. If you need help quitting, talk to your doctor about stop-smoking programs and medicines. These can increase your chances of quitting for good. When should you call for help? [id number] anytime you think you may need emergency care. For example, call if:  · You have severe trouble breathing. Call your doctor now or seek immediate medical care if:  · You cough up blood. · You have new or worse trouble breathing. · You have a new or higher fever. · You have a new rash.   Watch closely for changes in your health, and be sure to contact your doctor if:  · You cough more deeply or more often, especially if you notice more mucus or a change in the color of your mucus. · You have new symptoms, such as a sore throat, an earache, or sinus pain. · You do not get better as expected. Where can you learn more? Go to https://[email or web address]. org and sign in to your [employer] account. Enter D279 in the [facility] box to learn more about \"Cough: Care Instructions. \"     If you do not have an account, please click on the \"Sign Up Now\" link. Current as of: February 24, 2020               Content Version: 12.5  © 1618-8326 [employer]. Care instructions adapted under license by [facility] ([facility]). If you have questions about a medical condition or this instruction, always ask your healthcare professional. [address] any warranty or liability for your use of this information. Patient Education        High Blood Pressure: Care Instructions  Overview     It's normal for blood pressure to go up and down throughout the day. But if it stays up, you have high blood pressure. Another name for high blood pressure is hypertension. Despite what a lot of people think, high blood pressure usually doesn't cause headaches or make you feel dizzy or lightheaded. It usually has no symptoms. But it does increase your risk of stroke, heart attack, and other problems. You and your doctor will talk about your risks of these problems based on your blood pressure. Your doctor will give you a goal for your blood pressure. Your goal will be based on your health and your age. Lifestyle changes, such as eating healthy and being active, are always important to help lower blood pressure. You might also take medicine to reach your blood pressure goal.  Follow-up care is a key part of your treatment and safety. Be sure to make and go to all appointments, and call your doctor if you are having problems. It's also a good idea to know your test results and keep a list of the medicines you take.   How can you care for risk for heart attack and stroke. If you need help quitting, talk to your doctor about stop-smoking programs and medicines. These can increase your chances of quitting for good. When should you call for help? Call  911 anytime you think you may need emergency care. This may mean having symptoms that suggest that your blood pressure is causing a serious heart or blood vessel problem. Your blood pressure may be over 180/120. For example, call 911 if:  · You have symptoms of a heart attack. These may include:  ? Chest pain or pressure, or a strange feeling in the chest.  ? Sweating. ? Shortness of breath. ? Nausea or vomiting. ? Pain, pressure, or a strange feeling in the back, neck, jaw, or upper belly or in one or both shoulders or arms. ? Lightheadedness or sudden weakness. ? A fast or irregular heartbeat. · You have symptoms of a stroke. These may include:  ? Sudden numbness, tingling, weakness, or loss of movement in your face, arm, or leg, especially on only one side of your body. ? Sudden vision changes. ? Sudden trouble speaking. ? Sudden confusion or trouble understanding simple statements. ? Sudden problems with walking or balance. ? A sudden, severe headache that is different from past headaches. · You have severe back or belly pain. Do not wait until your blood pressure comes down on its own. Get help right away. Call your doctor now or seek immediate care if:  · Your blood pressure is much higher than normal (such as 180/120 or higher), but you don't have symptoms. · You think high blood pressure is causing symptoms, such as:  ? Severe headache.  ? Blurry vision. Watch closely for changes in your health, and be sure to contact your doctor if:  · Your blood pressure measures higher than your doctor recommends at least 2 times. That means the top number is higher or the bottom number is higher, or both. · You think you may be having side effects from your blood pressure medicine.   Where

## 2020-08-10 ENCOUNTER — NURSE ONLY (OUTPATIENT)
Dept: FAMILY MEDICINE CLINIC | Age: 50
End: 2020-08-10

## 2020-08-10 VITALS — DIASTOLIC BLOOD PRESSURE: 78 MMHG | SYSTOLIC BLOOD PRESSURE: 132 MMHG

## 2020-08-19 ENCOUNTER — TELEPHONE (OUTPATIENT)
Dept: FAMILY MEDICINE CLINIC | Age: 50
End: 2020-08-19

## 2020-08-26 ENCOUNTER — HOSPITAL ENCOUNTER (OUTPATIENT)
Dept: SLEEP CENTER | Age: 50
Discharge: HOME | End: 2020-08-28
Payer: COMMERCIAL

## 2020-08-26 PROCEDURE — 95806 SLEEP STUDY UNATT&RESP EFFT: CPT

## 2020-08-31 RX ORDER — NABUMETONE 500 MG/1
500 TABLET, FILM COATED ORAL 2 TIMES DAILY PRN
Qty: 60 TABLET | Refills: 1 | Status: SHIPPED | OUTPATIENT
Start: 2020-08-31 | End: 2021-02-17 | Stop reason: CLARIF

## 2020-08-31 NOTE — TELEPHONE ENCOUNTER
Blaire Lopez is calling in requesting a refill on medication(s):    Requested Prescriptions     Pending Prescriptions Disp Refills    nabumetone (RELAFEN) 500 MG tablet 60 tablet 1     Sig: Take 1 tablet by mouth 2 times daily as needed for Pain          Patient's Last Office Visit:  8/6/2020     Patient's Next Visit:  Visit date not found     Patient's Medication Contract:  Medication Contract and Consent for Opioid Use Documents Filed      No documents found                 Tox Screen:  Urine Drug Screenings (1 yr)     No resulted procedures found. Pharmacy:  Please send the medication to the pharmacy listed.       Other Comments:  none

## 2020-09-18 ENCOUNTER — TELEPHONE (OUTPATIENT)
Dept: FAMILY MEDICINE CLINIC | Age: 50
End: 2020-09-18

## 2020-09-18 RX ORDER — AMLODIPINE BESYLATE 5 MG/1
5 TABLET ORAL DAILY
Qty: 90 TABLET | Refills: 3 | Status: SHIPPED | OUTPATIENT
Start: 2020-09-18 | End: 2021-01-15 | Stop reason: CLARIF

## 2020-09-18 NOTE — TELEPHONE ENCOUNTER
ent referral sent  If any signs of infection (fever, productive sputum, etc call asap and be seen)  Might need pulmonary  F/u with me after ent.

## 2020-09-18 NOTE — TELEPHONE ENCOUNTER
Patient called and is requesting a referral to an ENT. She is still coughing and nothing seems to be working. She states that she had trouble with her tonsils when she was younger. She is frustrated and knows that you have tried everything.

## 2020-09-18 NOTE — TELEPHONE ENCOUNTER
Patient requesting medication refill. Please approve or deny this request.    Rx requested:  Requested Prescriptions     Pending Prescriptions Disp Refills    amLODIPine (NORVASC) 5 MG tablet 30 tablet 0     Sig: Take 1 tablet by mouth daily         Last Office Visit:   8/6/2020      Next Visit Date:  No future appointments.

## 2020-11-03 RX ORDER — BACLOFEN 10 MG/1
TABLET ORAL
Qty: 60 TABLET | Refills: 0 | Status: SHIPPED | OUTPATIENT
Start: 2020-11-03 | End: 2021-05-11 | Stop reason: SDUPTHER

## 2020-11-03 NOTE — TELEPHONE ENCOUNTER
requesting medication refill.      Rx requested:  Requested Prescriptions      No prescriptions requested or ordered in this encounter       Last Office Visit:   8/6/2020    Last Tox screen:        Last Medication contract:      Next Visit Date:  Future Appointments   Date Time Provider Prachi To   11/25/2020 10:15 AM Emiliano Sabillon MD [facility] Lele Michaels

## 2020-11-18 RX ORDER — LISINOPRIL 5 MG/1
TABLET ORAL
Qty: 90 TABLET | Refills: 1 | Status: SHIPPED | OUTPATIENT
Start: 2020-11-18 | End: 2021-01-15 | Stop reason: CLARIF

## 2020-11-23 ENCOUNTER — OFFICE VISIT (OUTPATIENT)
Dept: CARDIOLOGY CLINIC | Age: 50
End: 2020-11-23
Payer: COMMERCIAL

## 2020-11-23 VITALS
HEART RATE: 68 BPM | RESPIRATION RATE: 20 BRPM | DIASTOLIC BLOOD PRESSURE: 82 MMHG | SYSTOLIC BLOOD PRESSURE: 126 MMHG | WEIGHT: 193 LBS | BODY MASS INDEX: 29.25 KG/M2 | OXYGEN SATURATION: 99 % | HEIGHT: 68 IN

## 2020-11-23 PROCEDURE — 99244 OFF/OP CNSLTJ NEW/EST MOD 40: CPT | Performed by: INTERNAL MEDICINE

## 2020-11-23 RX ORDER — IPRATROPIUM BROMIDE 42 UG/1
SPRAY, METERED NASAL
COMMUNITY
Start: 2020-11-20 | End: 2021-06-14 | Stop reason: SDUPTHER

## 2020-11-23 RX ORDER — MONTELUKAST SODIUM 10 MG/1
TABLET ORAL
COMMUNITY
Start: 2020-10-30 | End: 2021-02-17 | Stop reason: CLARIF

## 2020-11-23 RX ORDER — METOPROLOL SUCCINATE 50 MG/1
50 TABLET, EXTENDED RELEASE ORAL DAILY
Qty: 30 TABLET | Refills: 2 | Status: SHIPPED | OUTPATIENT
Start: 2020-11-23 | End: 2021-02-17 | Stop reason: SDUPTHER

## 2020-11-23 ASSESSMENT — ENCOUNTER SYMPTOMS
SHORTNESS OF BREATH: 0
APNEA: 0
BLOOD IN STOOL: 0
VOMITING: 0
CHEST TIGHTNESS: 0
DIARRHEA: 0
NAUSEA: 0

## 2020-11-23 NOTE — PROGRESS NOTES
[facility] CARDIOLOGY OFFICE FOLLOW-UP      Patient: Kerry Olivera  YOB: 1970  MRN: [id number]    Chief Complaint:  Chief Complaint   Patient presents with   Elis Chavez Cardiologist     Referred by Dr. Radha Timmons from [facility] PCP    Palpitations         Subjective/HPI:  11/23/2020: Patient presents today for evaluation of palpitation. Occasional chest pain. No headaches. Had an episode of profound dizziness but no syncope. Also complains of shortness of breath. Has history of hypertension. Dr. Onur Werner is consulting and has ordered a 48-hour Holter which is pending. Set up for Leonarda Bodo and echo and see me. Shortness of breath occurs on exertion. No ankle edema.   She has history of chronic back pain and therefore Leonarda Bodo is ordered instead of regular stress test               Past Medical History:   Diagnosis Date    Cancer ([address]     colon    Chronic back pain     GERD (gastroesophageal reflux disease)     Hypertension     Neck pain     Osteoarthritis     Sleep apnea        Past Surgical History:   Procedure Laterality Date    COLONOSCOPY      HYSTERECTOMY, TOTAL ABDOMINAL         Family History   Problem Relation Age of Onset    High Blood Pressure Mother     High Cholesterol Mother     Cervical Cancer Mother         s/p cryosurgery    High Cholesterol Father     High Blood Pressure Father     Diabetes Father     Rectal Cancer Father     High Blood Pressure Maternal Aunt     High Cholesterol Maternal Aunt     Other Maternal Aunt     Diabetes Maternal Uncle     Prostate Cancer Maternal Uncle     Stomach Cancer Maternal Uncle     Other Maternal Grandmother     Prostate Cancer Maternal Grandfather     Stomach Cancer Paternal Aunt        Social History     Socioeconomic History    Marital status:      Spouse name: None    Number of children: None    Years of education: None    Highest education level: None   Occupational History    None   Social Needs    Financial resource strain: None    Food insecurity     Worry: None     Inability: None    Transportation needs     Medical: None     Non-medical: None   Tobacco Use    Smoking status: Never Smoker    Smokeless tobacco: Never Used   Substance and Sexual Activity    Alcohol use: No    Drug use: No    Sexual activity: Yes     Partners: Male     Comment:     Lifestyle    Physical activity     Days per week: None     Minutes per session: None    Stress: None   Relationships    Social connections     Talks on phone: None     Gets together: None     Attends Denominational service: None     Active member of club or organization: None     Attends meetings of clubs or organizations: None     Relationship status: None    Intimate partner violence     Fear of current or ex partner: None     Emotionally abused: None     Physically abused: None     Forced sexual activity: None   Other Topics Concern    None   Social History Narrative    None       Allergies   Allergen Reactions    Morphine Other (See Comments)     heart races    Tramadol Other (See Comments)     dizziness, lightheaded, nausea       Current Outpatient Medications   Medication Sig Dispense Refill    montelukast (SINGULAIR) 10 MG tablet TAKE 1 TABLET BY MOUTH ONCE DAILY      ipratropium (ATROVENT) 0.06 % nasal spray       metoprolol succinate (TOPROL XL) 50 MG extended release tablet Take 1 tablet by mouth daily 30 tablet 2    lisinopril (PRINIVIL;ZESTRIL) 5 MG tablet Take 1 tablet by mouth once daily 90 tablet 1    baclofen (LIORESAL) 10 MG tablet TAKE 1 TABLET BY MOUTH TWICE DAILY AS NEEDED MUSCLE  PAIN  OR  SPASMS (Hold for sedation, no driving or operating machinery while using these) 60 tablet 0    amLODIPine (NORVASC) 5 MG tablet Take 1 tablet by mouth daily 90 tablet 3    albuterol sulfate HFA (VENTOLIN HFA) 108 (90 Base) MCG/ACT inhaler Inhale 1 puff into the lungs every 6 hours as needed for Wheezing or Shortness of Breath 1 Inhaler 0    Blood Pressure KIT Dx: HTN 1 kit 0    nabumetone (RELAFEN) 500 MG tablet Take 1 tablet by mouth 2 times daily as needed for Pain 60 tablet 1     No current facility-administered medications for this visit. Review of Systems:   Review of Systems   Constitutional: Negative for activity change, appetite change, diaphoresis, fatigue and unexpected weight change. HENT: Negative for facial swelling, nosebleeds, trouble swallowing and voice change. Respiratory: Negative for apnea, cough, chest tightness, shortness of breath, wheezing and stridor. Cardiovascular: Negative for chest pain, palpitations and leg swelling. Gastrointestinal: Negative for abdominal distention, anal bleeding, blood in stool, diarrhea, nausea and vomiting. Genitourinary: Negative for decreased urine volume and dysuria. Musculoskeletal: Negative for gait problem, myalgias, neck pain and neck stiffness. Skin: Negative for color change, pallor, rash and wound. Neurological: Negative for dizziness, seizures, syncope, facial asymmetry, weakness, light-headedness, numbness and headaches. Hematological: Does not bruise/bleed easily. Psychiatric/Behavioral: Negative for agitation, behavioral problems, confusion, hallucinations and suicidal ideas. The patient is not nervous/anxious. All other systems reviewed and are negative. Review of System is negative except for as mentioned above. Physical Examination:    /82 (Site: Right Upper Arm, Position: Sitting, Cuff Size: Medium Adult)   Pulse 68   Resp 20   Ht 5' 8\" (1.727 m)   Wt 193 lb (87.5 kg)   SpO2 99%   BMI 29.35 kg/m²    Physical Exam   Constitutional: She appears healthy. No distress. HENT:   Nose: Nose normal.   Mouth/Throat: Dentition is normal. Oropharynx is clear. Eyes: Pupils are equal, round, and reactive to light. Conjunctivae are normal.   Neck: Normal range of motion and thyroid normal. Neck supple.    Cardiovascular: Regular rhythm, S1 normal, S2 normal, normal heart sounds, intact distal pulses and normal pulses. PMI is not displaced. No murmur heard. Pulmonary/Chest: She has no wheezes. She has no rales. She exhibits no tenderness. Abdominal: Soft. Bowel sounds are normal. She exhibits no distension and no mass. There is no splenomegaly or hepatomegaly. There is no abdominal tenderness. No hernia. Neurological: She is alert and oriented to person, place, and time. She has normal motor skills. Gait normal.   Skin: Skin is warm and dry. No cyanosis. No jaundice. Nails show no clubbing.            Patient Active Problem List   Diagnosis    Malignant tumor of colon ([address]    Essential hypertension    H/O left hemicolectomy    SEBASTIÁN (obstructive sleep apnea)    Renal calculi     (spontaneous vaginal delivery)    Cervical spondylosis without myelopathy    Carpal tunnel syndrome on both sides    Esophageal dysphagia    Polyp of descending colon    History of colon cancer           Orders Placed This Encounter   Procedures    NM Myocardial Spect Rest Exercise or Rx     Standing Status:   Future     Standing Expiration Date:   2021     Order Specific Question:   Reason for Exam?     Answer:   Angina     Order Specific Question:   Reason for Exam?     Answer:   Hypertension     Order Specific Question:   Reason for Exam?     Answer:   Syncope     Order Specific Question:   Procedure Type     Answer:   Rx     Order Specific Question:   Reason for exam:     Answer:   ANGINA     Order Specific Question:   Reason for exam:     Answer:   HTN     Order Specific Question:   Reason for exam:     Answer:   NEAR SYNCOPE    ECHO Complete 2D W Doppler W Color     Standing Status:   Future     Standing Expiration Date:   2021     Order Specific Question:   Reason for exam:     Answer:   ANGINA     Order Specific Question:   Reason for exam:     Answer:   HTN     Order Specific Question:   Reason for exam:     Answer: NEAR SYNCOPE         Orders Placed This Encounter   Medications    metoprolol succinate (TOPROL XL) 50 MG extended release tablet     Sig: Take 1 tablet by mouth daily     Dispense:  30 tablet     Refill:  2             Assessment/Orders:       ICD-10-CM    1. Angina at rest [facility])  I20.8 ECHO Complete 2D W Doppler W Color     NM Myocardial Spect Rest Exercise or Rx   2. Hypertension, unspecified type  I10 ECHO Complete 2D W Doppler W Color     NM Myocardial Spect Rest Exercise or Rx   3.  Near syncope  R55 ECHO Complete 2D W Doppler W Color     NM Myocardial Spect Rest Exercise or Rx       Orders Placed This Encounter   Medications    metoprolol succinate (TOPROL XL) 50 MG extended release tablet     Sig: Take 1 tablet by mouth daily     Dispense:  30 tablet     Refill:  2       Medications Discontinued During This Encounter   Medication Reason    b complex vitamins capsule Therapy completed    famotidine (PEPCID) 20 MG tablet Therapy completed    magnesium (MAGNESIUM-OXIDE) 250 MG TABS tablet Therapy completed       Orders Placed This Encounter   Procedures    NM Myocardial Spect Rest Exercise or Rx     Standing Status:   Future     Standing Expiration Date:   11/23/2021     Order Specific Question:   Reason for Exam?     Answer:   Angina     Order Specific Question:   Reason for Exam?     Answer:   Hypertension     Order Specific Question:   Reason for Exam?     Answer:   Syncope     Order Specific Question:   Procedure Type     Answer:   Rx     Order Specific Question:   Reason for exam:     Answer:   ANGINA     Order Specific Question:   Reason for exam:     Answer:   HTN     Order Specific Question:   Reason for exam:     Answer:   NEAR SYNCOPE    ECHO Complete 2D W Doppler W Color     Standing Status:   Future     Standing Expiration Date:   11/23/2021     Order Specific Question:   Reason for exam:     Answer:   ANGINA     Order Specific Question:   Reason for exam:     Answer:   HTN     Order Specific Question:   Reason for exam:     Answer:   NEAR SYNCOPE         Plan:  Patient to have Echo and Lexiscan Stress tests done    Will start Toprol 50mg Nightly    Stay on same medications. See me in 6-8 weeks after testing.         Electronically signed by: Yunier Peña MD  11/24/2020 2:41 PM

## 2020-11-24 ASSESSMENT — ENCOUNTER SYMPTOMS
COLOR CHANGE: 0
TROUBLE SWALLOWING: 0
WHEEZING: 0
VOICE CHANGE: 0
STRIDOR: 0
ANAL BLEEDING: 0
FACIAL SWELLING: 0
COUGH: 0
ABDOMINAL DISTENTION: 0

## 2020-12-10 ENCOUNTER — TELEPHONE (OUTPATIENT)
Dept: ADMINISTRATIVE | Age: 50
End: 2020-12-10

## 2020-12-10 NOTE — TELEPHONE ENCOUNTER
The patient's insurance company is denying the NM stress test ordered for this patient. This denial could possibly be overturned by doing a peer to peer with the insurance company. This peer to peer can be done by calling [facility] @ [phone] and referencing tracking # [id number]. This peer to peer needs to be completed by noon on Friday 12/11/2020 or the patient's test will need to be cancelled. The denial reasoning has been uploaded into the media tab of the patient's chart for your review. Please let me know if you've any questions - thank you!     [facility] Authorization  Phone: [phone]

## 2020-12-14 ENCOUNTER — TELEPHONE (OUTPATIENT)
Dept: CARDIOLOGY CLINIC | Age: 50
End: 2020-12-14

## 2020-12-14 NOTE — TELEPHONE ENCOUNTER
Patient is scheduled for echo today. [facility]  # [phone] case# [id number]

## 2020-12-14 NOTE — TELEPHONE ENCOUNTER
Stephanie, per Dr. Corrie Day we are going to change the order to a GXT I stead of the nucleare stress. Please close this referral. Thank you.

## 2020-12-28 NOTE — TELEPHONE ENCOUNTER
DR. Edgar Solis HAS BEEN OUT OF THE OFFICE SINCE 12/15/2020    ECHO WAS CANCELLED FOR PATIENT ON 12/15/2020

## 2021-01-15 ENCOUNTER — TELEPHONE (OUTPATIENT)
Dept: FAMILY MEDICINE CLINIC | Age: 51
End: 2021-01-15

## 2021-01-15 ENCOUNTER — VIRTUAL VISIT (OUTPATIENT)
Dept: FAMILY MEDICINE CLINIC | Age: 51
End: 2021-01-15
Payer: COMMERCIAL

## 2021-01-15 DIAGNOSIS — Z11.4 SCREENING FOR HIV (HUMAN IMMUNODEFICIENCY VIRUS): ICD-10-CM

## 2021-01-15 DIAGNOSIS — Z11.59 ENCOUNTER FOR HEPATITIS C SCREENING TEST FOR LOW RISK PATIENT: ICD-10-CM

## 2021-01-15 DIAGNOSIS — Z12.31 ENCOUNTER FOR SCREENING MAMMOGRAM FOR MALIGNANT NEOPLASM OF BREAST: ICD-10-CM

## 2021-01-15 DIAGNOSIS — E55.9 VITAMIN D DEFICIENCY: ICD-10-CM

## 2021-01-15 DIAGNOSIS — I10 ESSENTIAL HYPERTENSION: Primary | ICD-10-CM

## 2021-01-15 DIAGNOSIS — R00.2 PALPITATIONS: ICD-10-CM

## 2021-01-15 PROCEDURE — 99443 PR PHYS/QHP TELEPHONE EVALUATION 21-30 MIN: CPT | Performed by: INTERNAL MEDICINE

## 2021-01-15 RX ORDER — LOSARTAN POTASSIUM 25 MG/1
25 TABLET ORAL DAILY
Qty: 30 TABLET | Refills: 0 | Status: SHIPPED | OUTPATIENT
Start: 2021-01-15 | End: 2021-01-15 | Stop reason: DRUGHIGH

## 2021-01-15 RX ORDER — LOSARTAN POTASSIUM 25 MG/1
TABLET ORAL
Qty: 30 TABLET | Refills: 0 | Status: SHIPPED | OUTPATIENT
Start: 2021-01-15 | End: 2021-02-17 | Stop reason: DRUGHIGH

## 2021-01-15 ASSESSMENT — ENCOUNTER SYMPTOMS
BACK PAIN: 0
SHORTNESS OF BREATH: 0
ABDOMINAL PAIN: 0
EYE PAIN: 0

## 2021-01-15 ASSESSMENT — PATIENT HEALTH QUESTIONNAIRE - PHQ9
SUM OF ALL RESPONSES TO PHQ QUESTIONS 1-9: 0
1. LITTLE INTEREST OR PLEASURE IN DOING THINGS: 0

## 2021-01-15 NOTE — TELEPHONE ENCOUNTER
She mentions she needs insurance approval for her cardiology tests  Please forward to dr Mert Fink and his office.

## 2021-01-15 NOTE — PATIENT INSTRUCTIONS
Patient Education        losartan  Pronunciation:  jenise VIDYA tan  Brand:  Cozaar  What is the most important information I should know about losartan? Do not use if you are pregnant, and tell your doctor right away if you become pregnant. Losartan can cause injury or death to the unborn baby during your second or third trimester. If you have diabetes, do not use losartan together with any medication that contains aliskiren (a blood pressure medicine). What is losartan? Losartan is an angiotensin II receptor antagonist (sometimes called an ARB blocker). Losartan is used to treat high blood pressure (hypertension) in adults and children who are at least 10years old. It is also used to lower the risk of stroke in certain people with heart disease. Losartan is also used to slow long-term kidney damage in people with type 2 diabetes who also have high blood pressure. Losartan may also be used for purposes not listed in this medication guide. What should I discuss with my healthcare provider before taking losartan? You should not use losartan if you are allergic to it. If you have diabetes, do not use losartan together with any medication that contains aliskiren (a blood pressure medicine). You may also need to avoid taking losartan with aliskiren if you have kidney disease. Do not use if you are pregnant, and tell your doctor right away if you become pregnant. Losartan can cause injury or death to the unborn baby if you take the medicine during your second or third trimester. Tell your doctor if you have ever had:  · kidney disease;  · liver disease;  · congestive heart failure;  · an electrolyte imbalance (such as high levels of potassium in your blood);  · if you are on a low-salt diet; or  · if you are dehydrated. You should not breast-feed while using this medicine. Losartan is not approved for use by anyone younger than 10years old. How should I take losartan?   Follow all directions on your urinate;  · high potassium level --nausea, weakness, tingly feeling, chest pain, irregular heartbeats, loss of movement; or  · kidney problems --little or no urination, rapid weight gain, painful or difficult urination, swelling in your hands, feet, or ankles. Common side effects may include:  · dizziness;  · back pain; or  · cold symptoms such as stuffy nose, sneezing, sore throat. This is not a complete list of side effects and others may occur. Call your doctor for medical advice about side effects. You may report side effects to FDA at 3-399-FDA-7382. What other drugs will affect losartan? Tell your doctor about all your other medicines, especially:  · a diuretic or \"water pill\";  · other blood pressure medications;  · lithium; or  · NSAIDs (nonsteroidal anti-inflammatory drugs) --aspirin, ibuprofen (Advil, Motrin), naproxen (Aleve), celecoxib, diclofenac, indomethacin, meloxicam, and others. This list is not complete. Other drugs may affect losartan, including prescription and over-the-counter medicines, vitamins, and herbal products. Not all possible drug interactions are listed here. Where can I get more information? Your pharmacist can provide more information about losartan. Remember, keep this and all other medicines out of the reach of children, never share your medicines with others, and use this medication only for the indication prescribed. Every effort has been made to ensure that the information provided by [address] is accurate, up-to-date, and complete, but no guarantee is made to that effect. Drug information contained herein may be time sensitive. [facility] information has been compiled for use by healthcare practitioners and consumers in the [place] and therefore [facility] does not warrant that uses outside of the [place] are appropriate, unless specifically indicated otherwise.  [facility]'s drug information does not endorse drugs, diagnose patients or recommend therapy. [facility]'s drug information is an informational resource designed to assist licensed healthcare practitioners in caring for their patients and/or to serve consumers viewing this service as a supplement to, and not a substitute for, the expertise, skill, knowledge and judgment of healthcare practitioners. The absence of a warning for a given drug or drug combination in no way should be construed to indicate that the drug or drug combination is safe, effective or appropriate for any given patient. [facility] does not assume any responsibility for any aspect of healthcare administered with the aid of information [facility] provides. The information contained herein is not intended to cover all possible uses, directions, precautions, warnings, drug interactions, allergic reactions, or adverse effects. If you have questions about the drugs you are taking, check with your doctor, nurse or pharmacist.  Copyright 2141-3152 [address] Version: 16.01. Revision date: 4/10/2019. Care instructions adapted under license by [facility] ([facility]). If you have questions about a medical condition or this instruction, always ask your healthcare professional. [place] [address] any warranty or liability for your use of this information.

## 2021-01-15 NOTE — PROGRESS NOTES
Subjective:      Patient ID: Natacha Tate is a 45 y.o. female who presents today with:  No chief complaint on file. HPI   Hypertension   \"the funny way my heart is acting up\"   She saw dr Rios Mis test and echo were ordered  Per patient insurance didn't cover   Lisinopril 5 mg and amlodipine 5 mg  She stopped both of these  She mentions her cough is better off the lisinopril. She mentions she was at the allergist.   She mentions she no longer gets dizzy off of her amlodipine and lisinopril. Mentions gets \"crazy beats\" etc when she takes her medications. She also is not taking the toprol which was given to her via cardiology. She self stopped these medications. No current chest pain.    Past Medical History:   Diagnosis Date    Cancer ([facility] [address]     colon    Chronic back pain     GERD (gastroesophageal reflux disease)     Hypertension     Neck pain     Osteoarthritis     Sleep apnea      Past Surgical History:   Procedure Laterality Date    COLONOSCOPY      HYSTERECTOMY, TOTAL ABDOMINAL       Social History     Socioeconomic History    Marital status:      Spouse name: Not on file    Number of children: Not on file    Years of education: Not on file    Highest education level: Not on file   Occupational History    Not on file   Social Needs    Financial resource strain: Not on file    Food insecurity     Worry: Not on file     Inability: Not on file    Transportation needs     Medical: Not on file     Non-medical: Not on file   Tobacco Use    Smoking status: Never Smoker    Smokeless tobacco: Never Used   Substance and Sexual Activity    Alcohol use: No    Drug use: No    Sexual activity: Yes     Partners: Male     Comment:     Lifestyle    Physical activity     Days per week: Not on file     Minutes per session: Not on file    Stress: Not on file   Relationships    Social connections     Talks on phone: Not on file     Gets together: Not on file     Attends Restorationist service: Not on file     Active member of club or organization: Not on file     Attends meetings of clubs or organizations: Not on file     Relationship status: Not on file    Intimate partner violence     Fear of current or ex partner: Not on file     Emotionally abused: Not on file     Physically abused: Not on file     Forced sexual activity: Not on file   Other Topics Concern    Not on file   Social History Narrative    Not on file     Allergies   Allergen Reactions    Lisinopril      Cough     Morphine Other (See Comments)     heart races    Tramadol Other (See Comments)     dizziness, lightheaded, nausea     Current Outpatient Medications on File Prior to Visit   Medication Sig Dispense Refill    montelukast (SINGULAIR) 10 MG tablet TAKE 1 TABLET BY MOUTH ONCE DAILY      ipratropium (ATROVENT) 0.06 % nasal spray       metoprolol succinate (TOPROL XL) 50 MG extended release tablet Take 1 tablet by mouth daily 30 tablet 2    baclofen (LIORESAL) 10 MG tablet TAKE 1 TABLET BY MOUTH TWICE DAILY AS NEEDED MUSCLE  PAIN  OR  SPASMS (Hold for sedation, no driving or operating machinery while using these) 60 tablet 0    albuterol sulfate HFA (VENTOLIN HFA) 108 (90 Base) MCG/ACT inhaler Inhale 1 puff into the lungs every 6 hours as needed for Wheezing or Shortness of Breath 1 Inhaler 0    Blood Pressure KIT Dx: HTN 1 kit 0    nabumetone (RELAFEN) 500 MG tablet Take 1 tablet by mouth 2 times daily as needed for Pain 60 tablet 1     No current facility-administered medications on file prior to visit. I have personally reviewed the ROS, PMH, PFH, and social history     Review of Systems   Constitutional: Negative for chills and fever. HENT: Negative for congestion. Eyes: Negative for pain. Respiratory: Negative for shortness of breath. Cardiovascular: Negative for chest pain. Gastrointestinal: Negative for abdominal pain. Genitourinary: Negative for hematuria.    Musculoskeletal: Negative for back pain. Allergic/Immunologic: Negative for immunocompromised state. Neurological: Negative for headaches. Psychiatric/Behavioral: Negative for hallucinations. Objective: There were no vitals taken for this visit. Physical Exam    Unable to perform given telephone visit. Assessment:       Diagnosis Orders   1. Essential hypertension  TSH with Reflex    Vitamin D 25 Hydroxy    CBC Auto Differential    Comprehensive Metabolic Panel    Hemoglobin A1C    Lipid Panel    Magnesium   2. Palpitations  TSH with Reflex    Vitamin D 25 Hydroxy    CBC Auto Differential    Comprehensive Metabolic Panel    Hemoglobin A1C    Lipid Panel    Magnesium   3. Vitamin D deficiency  TSH with Reflex    Vitamin D 25 Hydroxy    CBC Auto Differential    Comprehensive Metabolic Panel    Hemoglobin A1C    Lipid Panel    Magnesium         Plan:   Telephone visit done because of coronavirus pandemic. Time spent 21 minutes   explained billeable visit, patient understands and agrees to continue  I was at home and patient was at home during this visit. Needs in office bp and ekg asap  Still recommend she complete her 48 hour holter, stress testing, echo, etc.   Risk of MI, etc explained. Risk of stroke, etc explained.              Orders Placed This Encounter   Procedures    TSH with Reflex     Standing Status:   Future     Standing Expiration Date:   1/15/2022    Vitamin D 25 Hydroxy     Standing Status:   Future     Standing Expiration Date:   1/15/2022    CBC Auto Differential     Standing Status:   Future     Standing Expiration Date:   1/15/2022    Comprehensive Metabolic Panel     Standing Status:   Future     Standing Expiration Date:   1/15/2022    Hemoglobin A1C     Standing Status:   Future     Standing Expiration Date:   1/15/2022    Lipid Panel     Standing Status:   Future     Standing Expiration Date:   1/15/2022     Order Specific Question:   Is Patient Fasting?/# of Hours     Answer: 10    Magnesium     Standing Status:   Future     Standing Expiration Date:   1/15/2022     Orders Placed This Encounter   Medications    losartan (COZAAR) 25 MG tablet     Sig: Take 1 tablet by mouth daily     Dispense:  30 tablet     Refill:  0   if you get chest pain, shortness of breath, palpitations, or feel you're going to pass out call 911. Dioni Angry Keep your ccm visit or schedule if you don't have one. Refused flu shot 2020   Denies urinary incontinence, fevers, chills, numbness in groin, falls, trauma, etc.   If anything should change or worsen call ASAP, don't wait for next scheduled appointment. Return in about 4 weeks (around 2/12/2021) for Chronic condition management/appointment, worsening symptoms, call ASAP for appointment.       Nallely Villalta MD

## 2021-01-20 ENCOUNTER — TELEPHONE (OUTPATIENT)
Dept: FAMILY MEDICINE CLINIC | Age: 51
End: 2021-01-20

## 2021-01-20 ENCOUNTER — NURSE ONLY (OUTPATIENT)
Dept: FAMILY MEDICINE CLINIC | Age: 51
End: 2021-01-20
Payer: COMMERCIAL

## 2021-01-20 VITALS
SYSTOLIC BLOOD PRESSURE: 138 MMHG | OXYGEN SATURATION: 98 % | HEART RATE: 65 BPM | DIASTOLIC BLOOD PRESSURE: 77 MMHG | RESPIRATION RATE: 15 BRPM

## 2021-01-20 DIAGNOSIS — R00.2 PALPITATIONS: Primary | ICD-10-CM

## 2021-01-20 DIAGNOSIS — I10 ESSENTIAL HYPERTENSION: Primary | ICD-10-CM

## 2021-01-20 PROCEDURE — 93000 ELECTROCARDIOGRAM COMPLETE: CPT | Performed by: INTERNAL MEDICINE

## 2021-01-20 RX ORDER — BLOOD PRESSURE TEST KIT
KIT MISCELLANEOUS
Qty: 1 KIT | Refills: 0 | Status: SHIPPED | OUTPATIENT
Start: 2021-01-20 | End: 2021-02-17 | Stop reason: CLARIF

## 2021-01-20 NOTE — TELEPHONE ENCOUNTER
Please call her tomorrow  How long has she been on metoprolol?   We need more bp readings given the wide discrepancy

## 2021-01-22 NOTE — TELEPHONE ENCOUNTER
bp machine ordered twice  Not sure why she doesn't have it  She was instructed to start losartan 12.5 mg once daily  And call in a few bp readings in a week or stop in in a week  Thanks,    Call immediately should something change.      Anila Garcia

## 2021-01-22 NOTE — TELEPHONE ENCOUNTER
Patient has been on Metoprolol 50 mg nightly since she consulted with Dr Linda Posada 11/2020. She has mentioned needing another BP machine as well.  She doesn't feel that the one she has at home is working correctly

## 2021-02-04 DIAGNOSIS — I20.9 ANGINA PECTORIS (HCC): ICD-10-CM

## 2021-02-04 DIAGNOSIS — R00.2 PALPITATIONS: ICD-10-CM

## 2021-02-04 DIAGNOSIS — R94.31 ABNORMAL EKG: Primary | ICD-10-CM

## 2021-02-04 DIAGNOSIS — R00.1 BRADYCARDIA: ICD-10-CM

## 2021-02-04 DIAGNOSIS — R06.09 DOE (DYSPNEA ON EXERTION): ICD-10-CM

## 2021-02-04 DIAGNOSIS — M19.90 OSTEOARTHRITIS, UNSPECIFIED OSTEOARTHRITIS TYPE, UNSPECIFIED SITE: ICD-10-CM

## 2021-02-04 NOTE — TELEPHONE ENCOUNTER
The old orders were discontinued due to they did not meet insurance criteria and a new encounter opened with updated information and orders.

## 2021-02-15 ENCOUNTER — TELEPHONE (OUTPATIENT)
Dept: FAMILY MEDICINE CLINIC | Age: 51
End: 2021-02-15

## 2021-02-15 NOTE — TELEPHONE ENCOUNTER
LMOM for patient to call back to let us know if she would like to reschedule or would like a phone call later today

## 2021-02-15 NOTE — TELEPHONE ENCOUNTER
Spoke w , he said she probably wont answer if she doesn't recognize #, said he will contact her at his lunch hour and let us know.

## 2021-02-17 ENCOUNTER — TELEPHONE (OUTPATIENT)
Dept: FAMILY MEDICINE CLINIC | Age: 51
End: 2021-02-17

## 2021-02-17 ENCOUNTER — VIRTUAL VISIT (OUTPATIENT)
Dept: FAMILY MEDICINE CLINIC | Age: 51
End: 2021-02-17
Payer: COMMERCIAL

## 2021-02-17 ENCOUNTER — NURSE ONLY (OUTPATIENT)
Dept: FAMILY MEDICINE CLINIC | Age: 51
End: 2021-02-17

## 2021-02-17 VITALS
SYSTOLIC BLOOD PRESSURE: 140 MMHG | OXYGEN SATURATION: 98 % | DIASTOLIC BLOOD PRESSURE: 82 MMHG | HEART RATE: 64 BPM | RESPIRATION RATE: 15 BRPM

## 2021-02-17 DIAGNOSIS — F41.9 ANXIETY: ICD-10-CM

## 2021-02-17 DIAGNOSIS — R00.2 PALPITATIONS: Primary | ICD-10-CM

## 2021-02-17 DIAGNOSIS — I10 ESSENTIAL HYPERTENSION: Primary | ICD-10-CM

## 2021-02-17 DIAGNOSIS — R06.09 EXERTIONAL DYSPNEA: ICD-10-CM

## 2021-02-17 DIAGNOSIS — G47.33 OSA (OBSTRUCTIVE SLEEP APNEA): ICD-10-CM

## 2021-02-17 DIAGNOSIS — R41.3 MEMORY LOSS: ICD-10-CM

## 2021-02-17 DIAGNOSIS — R00.2 PALPITATIONS: ICD-10-CM

## 2021-02-17 PROCEDURE — 99443 PR PHYS/QHP TELEPHONE EVALUATION 21-30 MIN: CPT | Performed by: INTERNAL MEDICINE

## 2021-02-17 RX ORDER — FEXOFENADINE HCL 180 MG/1
180 TABLET ORAL DAILY PRN
Qty: 90 TABLET | Refills: 3 | Status: SHIPPED | OUTPATIENT
Start: 2021-02-17 | End: 2021-05-11 | Stop reason: SDUPTHER

## 2021-02-17 RX ORDER — METOPROLOL SUCCINATE 50 MG/1
50 TABLET, EXTENDED RELEASE ORAL DAILY
Qty: 90 TABLET | Refills: 3 | Status: SHIPPED | OUTPATIENT
Start: 2021-02-17 | End: 2021-08-09 | Stop reason: SDUPTHER

## 2021-02-17 RX ORDER — BUSPIRONE HYDROCHLORIDE 5 MG/1
5 TABLET ORAL EVERY 12 HOURS PRN
Qty: 60 TABLET | Refills: 2 | Status: SHIPPED | OUTPATIENT
Start: 2021-02-17 | End: 2021-03-19

## 2021-02-17 RX ORDER — LOSARTAN POTASSIUM 25 MG/1
25 TABLET ORAL DAILY
Qty: 30 TABLET | Refills: 0 | Status: SHIPPED | OUTPATIENT
Start: 2021-02-17 | End: 2021-05-11 | Stop reason: SDUPTHER

## 2021-02-17 ASSESSMENT — PATIENT HEALTH QUESTIONNAIRE - PHQ9
1. LITTLE INTEREST OR PLEASURE IN DOING THINGS: 0
SUM OF ALL RESPONSES TO PHQ QUESTIONS 1-9: 0
SUM OF ALL RESPONSES TO PHQ QUESTIONS 1-9: 0
2. FEELING DOWN, DEPRESSED OR HOPELESS: 0
SUM OF ALL RESPONSES TO PHQ QUESTIONS 1-9: 0

## 2021-02-17 ASSESSMENT — ENCOUNTER SYMPTOMS
ABDOMINAL PAIN: 0
BACK PAIN: 0
EYE PAIN: 0
SHORTNESS OF BREATH: 0

## 2021-02-17 NOTE — PATIENT INSTRUCTIONS
Patient Education        Palpitaciones: Instrucciones de cuidado  Palpitations: Care Instructions  Instrucciones de cuidado    Las palpitaciones cardíacas son Latrelle Pratima sensación desagradable de que german corazón está latiendo rápido o de forma irregular. Puede sentir Latrelle Pratima palpitación ishan o un aleteo en el pecho. Podría sentirse jorge si el corazón estuviese saltándose latidos. Aunque las palpitaciones pueden ser causadas por un problema cardíaco, también pueden ocurrir debido a estrés, fatiga, o al consumo de alcohol, cafeína o nicotina. Muchos medicamentos jorge las pastillas para adelgazar, los antihistamínicos, los descongestionantes y algunos productos herbarios pueden causar palpitaciones cardíacas. Adriana todas las personas tienen palpitaciones de vez en cuando. Según los ANNERSTAD, german médico podría necesitar hacer más pruebas para tratar de encontrar la causa de brittney palpitaciones. La atención de seguimiento es mk parte clave de german tratamiento y seguridad. Asegúrese de hacer y acudir a todas las citas, y llame a german médico si está teniendo problemas. También es mk buena idea saber los resultados de brittney exámenes y mantener mk lista de los medicamentos que talisha. ¿Cómo puede cuidarse en el hogar? · Evite la cafeína, la nicotina y el exceso de alcohol. · No consuma drogas ilegales, jorge metanfetaminas y cocaína. · No tome medicamentos para bajar de peso o dietéticos a menos que consulte marysol con german médico.  · Duerma lo suficiente. · No coma en exceso. · Si tiene palpitaciones de nuevo, nida respiraciones profundas y trate de [place] podría reducir la velocidad de un corazón acelerado. · Si comienza a sentir [employer], acuéstese para evitar las lesiones que podrían ocurrir si se Etheleen Cost y  al suelo. · Mantenga un registro de brittney palpitaciones y llévelo a la siguiente maria g con el médico. Anote:  ? Harl Heimlich y la hora. ? German pulso. (Si german corazón late rápido, podría ser difícil tomarse el pulso). ?  Lo que estaba haciendo cuando Brandon Hyun. ? [address] [facility] ? Cualquier otro síntoma. · Si mk actividad le causa palpitaciones, reduzca el ritmo o deje de [place]. Hable con german médico antes de volver a hacer mayda actividad. · [employer] medicamentos exactamente jorge le fueron recetados. Llame a german médico si corrine estar teniendo problemas con german medicamento. ¿Cuándo debe pedir ayuda? Llame al 911 en cualquier momento que considere que necesita atención de [place]. Por ejemplo, llame si:    · Se desmayó (perdió el conocimiento).     · Tiene síntomas de un ataque al corazón. Estos podrían incluir:  ? Darian Maillard en el pecho, o mk sensación extraña en el pecho.  ? Sudoración. ? Falta de aire. ? Dolor, presión o mk sensación extraña en la espalda, el ana luisa, la mandíbula, la parte superior del abdomen, o en ary o ambos hombros o brazos. ? Aturdimiento o debilidad repentina. ? Latidos cardíacos rápidos o irregulares. Después de llamar al 911, es posible que el operador le diga que mastique 1 aspirina para adultos o 2 a 4 aspirinas de dosis baja. Espere a la ambulancia. No trate de conducir usted mismo un automóvil.     · Tiene síntomas de un ataque cerebral. Estos pueden incluir:  ? Entumecimiento, hormigueo, debilidad o parálisis repentinos en la alyson, el brazo o la pierna, sobre todo si ocurre en un solo lado del cuerpo. ? Cambios súbitos en la vista. ? Problemas repentinos para hablar. ? Confusión súbita o dificultad repentina para comprender frases sencillas. ? Problemas repentinos para caminar o mantener el equilibrio. ? Un dolor de solo intenso y repentino, distinto a los placido de Alondra Reap. Llame a german médico ahora mismo o busque atención médica inmediata si:    · Presenta palpitaciones cardíacas y:  ? Pearley Siad o aturdimiento, o siente que se va a desmayar. ? Tiene nueva o mayor falta de aire.    Preste especial atención a los cambios en german sam y asegúrese de comunicarse con german médico si:    · Continúa teniendo palpitaciones. ¿Dónde puede encontrar más información en inglés? Karma Cifuentes a [email or web address] e ingrese a german cuenta de MyChart. Sivakumar Seymour L883 en el cuadro \"Search Health Information\" para más información (en inglés) sobre \"Palpitaciones: Instrucciones de cuidado. \"     Si no tiene mk cuenta, nida maude en el enlace \"Sign Up Now\". Revisado: 16 lucas, 8274               BEBAMBGZ del contenido: 12.6  © 3780-3067 Healthwise, Incorporated. Las instrucciones de cuidado fueron adaptadas bajo licencia por [facility] ([facility]). Si usted tiene [facility] afección médica o sobre estas instrucciones, siempre pregunte a german profesional de sam. Healthwise, Incorporated niega toda garantía o responsabilidad por german uso de esta información.

## 2021-02-17 NOTE — TELEPHONE ENCOUNTER
ORDER 48 hour holter again please   Diagnosis palpitations  To be read by cardiology  Then let her know  Then close out message aftter.

## 2021-02-17 NOTE — PROGRESS NOTES
Subjective:      Patient ID: Rosevelt Runner is a 45 y.o. female who presents today with:  No chief complaint on file. HPI      Hypertension  Uncontrolled  Palpitations  Still ongoing  No fainting  No chest pain   Mentions dyspnea with it. Has nuclear stress for later this month as well. During the \"bad palpitations\" feels like symptoms radiate up toward her head. Anxiety makes her bp and palpitations worse  Female sex  Resting does help  No si/hi. Sometimes it happens at rest as well. SEBASTIÁN  Having issues  Mentions never got results from her most recent sleep study   Mentions stress  Mentions memory loss  Wants to see a neurologist  No falls, no trauma. No headaches.    Uncontrolled anxiety   Past Medical History:   Diagnosis Date    Cancer ([facility] [address]     colon    Chronic back pain     GERD (gastroesophageal reflux disease)     Hypertension     Neck pain     Osteoarthritis     Sleep apnea      Past Surgical History:   Procedure Laterality Date    COLONOSCOPY      HYSTERECTOMY, TOTAL ABDOMINAL       Social History     Socioeconomic History    Marital status:      Spouse name: Not on file    Number of children: Not on file    Years of education: Not on file    Highest education level: Not on file   Occupational History    Not on file   Social Needs    Financial resource strain: Not on file    Food insecurity     Worry: Not on file     Inability: Not on file    Transportation needs     Medical: Not on file     Non-medical: Not on file   Tobacco Use    Smoking status: Never Smoker    Smokeless tobacco: Never Used   Substance and Sexual Activity    Alcohol use: No    Drug use: No    Sexual activity: Yes     Partners: Male     Comment:     Lifestyle    Physical activity     Days per week: Not on file     Minutes per session: Not on file    Stress: Not on file   Relationships    Social connections     Talks on phone: Not on file     Gets together: Not on file     Attends Adventist service: Not on file     Active member of club or organization: Not on file     Attends meetings of clubs or organizations: Not on file     Relationship status: Not on file    Intimate partner violence     Fear of current or ex partner: Not on file     Emotionally abused: Not on file     Physically abused: Not on file     Forced sexual activity: Not on file   Other Topics Concern    Not on file   Social History Narrative    Not on file     Allergies   Allergen Reactions    Lisinopril      Cough     Morphine Other (See Comments)     heart races    Tramadol Other (See Comments)     dizziness, lightheaded, nausea     Current Outpatient Medications on File Prior to Visit   Medication Sig Dispense Refill    montelukast (SINGULAIR) 10 MG tablet TAKE 1 TABLET BY MOUTH ONCE DAILY      ipratropium (ATROVENT) 0.06 % nasal spray       baclofen (LIORESAL) 10 MG tablet TAKE 1 TABLET BY MOUTH TWICE DAILY AS NEEDED MUSCLE  PAIN  OR  SPASMS (Hold for sedation, no driving or operating machinery while using these) 60 tablet 0    albuterol sulfate HFA (VENTOLIN HFA) 108 (90 Base) MCG/ACT inhaler Inhale 1 puff into the lungs every 6 hours as needed for Wheezing or Shortness of Breath 1 Inhaler 0    Blood Pressure KIT Dx: HTN 1 kit 0    nabumetone (RELAFEN) 500 MG tablet Take 1 tablet by mouth 2 times daily as needed for Pain 60 tablet 1     No current facility-administered medications on file prior to visit. I have personally reviewed the ROS, PMH, PFH, and social history     Review of Systems   Constitutional: Negative for chills and fever. HENT: Negative for congestion. Eyes: Negative for pain. Respiratory: Negative for shortness of breath. Cardiovascular: Positive for palpitations. Negative for chest pain. Gastrointestinal: Negative for abdominal pain. Genitourinary: Negative for hematuria. Musculoskeletal: Negative for back pain.    Allergic/Immunologic: Negative for immunocompromised state.   Neurological: Negative for headaches. Psychiatric/Behavioral: Negative for hallucinations. The patient is nervous/anxious. Objective: There were no vitals taken for this visit. Physical Exam    Unable to perform given telephone visit. Assessment:       Diagnosis Orders   1. Essential hypertension     2. Nereyda Loera MD, Cardiology, [place]   3. Exertional dyspnea  Elana Ocasio MD, Cardiology, [place]   4. SEBASTIÁN (obstructive sleep apnea)     5. Memory loss  Armand Talavera MD, Neurology, [facility]   6. Anxiety           Plan:   Telephone visit done because of coronavirus pandemic. Time spent 21 minutes   explained billeable visit, patient understands and agrees to continue  I was at home and patient was at home during this visit. Needs to holter asap. Pending echo  Pending nuclear stress test  See cardiology after. Sleep study needs done (see te, was it done?)   Last bp was 138/77  Increase losartan to 25 mg once daily.    Add buspirone               Orders Placed This Encounter   Procedures   Elana Ocasio MD, Cardiology, [facility]     Referral Priority:   Routine     Referral Type:   Eval and Treat     Referral Reason:   Specialty Services Required     Referred to Provider:   Ria Grady MD     Requested Specialty:   Cardiology     Number of Visits Requested:   [address] [facility] Anthony Smith MD, Neurology, [facility]     Referral Priority:   Routine     Referral Type:   Eval and Treat     Referral Reason:   Specialty Services Required     Referred to Provider:   Kimberley Schwartz MD     Requested Specialty:   Neurology     Number of Visits Requested:   1     Orders Placed This Encounter   Medications    metoprolol succinate (TOPROL XL) 50 MG extended release tablet     Sig: Take 1 tablet by mouth daily     Dispense:  90 tablet     Refill:  3    losartan (COZAAR) 25 MG tablet     Sig: Take 1 tablet by mouth daily     Dispense:  30 tablet Refill:  0    fexofenadine (ALLEGRA) 180 MG tablet     Sig: Take 1 tablet by mouth daily as needed (ALLERGIES)     Dispense:  90 tablet     Refill:  3   asked to get bp check again in office  singulair risks EXPLAINED   fROM LAST VISIT   Still recommend she complete her 48 hour holter, stress testing, echo, etc.   Risk of MI, etc explained. Risk of stroke, etc explained. Understands buspirone can increase SI and to call immediately if this should occur. Mentions higher blood pressure. Than what we are getting. If anything should change or worsen call ASAP, don't wait for next scheduled appointment. Return in about 4 weeks (around 3/17/2021) for Chronic condition management/appointment, regularly scheduled appointment with PCP.       Yanick Dumas MD

## 2021-02-18 ENCOUNTER — TELEPHONE (OUTPATIENT)
Dept: FAMILY MEDICINE CLINIC | Age: 51
End: 2021-02-18

## 2021-02-22 NOTE — TELEPHONE ENCOUNTER
That is all that was sent over, I can call over to [facility] to see if anything else is missing from the report

## 2021-03-26 ENCOUNTER — HOSPITAL ENCOUNTER (EMERGENCY)
Age: 51
Discharge: HOME | End: 2021-03-26
Payer: COMMERCIAL

## 2021-03-26 VITALS
BODY MASS INDEX: 28.79 KG/M2 | TEMPERATURE: 97.2 F | RESPIRATION RATE: 16 BRPM | DIASTOLIC BLOOD PRESSURE: 91 MMHG | HEART RATE: 79 BPM | SYSTOLIC BLOOD PRESSURE: 180 MMHG | WEIGHT: 190 LBS | HEIGHT: 68 IN | OXYGEN SATURATION: 97 %

## 2021-03-26 DIAGNOSIS — I89.1 LYMPHANGITIS: Primary | ICD-10-CM

## 2021-03-26 PROCEDURE — 99285 EMERGENCY DEPT VISIT HI MDM: CPT

## 2021-03-26 PROCEDURE — 6370000000 HC RX 637 (ALT 250 FOR IP): Performed by: PHYSICIAN ASSISTANT

## 2021-03-26 RX ORDER — METHYLPREDNISOLONE 4 MG/1
TABLET ORAL
Qty: 1 KIT | Refills: 0 | Status: SHIPPED | OUTPATIENT
Start: 2021-03-26 | End: 2021-04-01

## 2021-03-26 RX ORDER — TOBRAMYCIN 3 MG/ML
1 SOLUTION/ DROPS OPHTHALMIC EVERY 4 HOURS
Qty: 1 BOTTLE | Refills: 0 | Status: SHIPPED | OUTPATIENT
Start: 2021-03-26 | End: 2021-04-05

## 2021-03-26 RX ORDER — PREDNISONE 20 MG/1
60 TABLET ORAL ONCE
Status: COMPLETED | OUTPATIENT
Start: 2021-03-26 | End: 2021-03-26

## 2021-03-26 RX ADMIN — PREDNISONE 60 MG: 20 TABLET ORAL at 18:15

## 2021-03-26 ASSESSMENT — ENCOUNTER SYMPTOMS
COUGH: 0
EYE DISCHARGE: 0
SHORTNESS OF BREATH: 0
VOMITING: 0
EYE REDNESS: 0
CHEST TIGHTNESS: 0
ABDOMINAL PAIN: 0
BACK PAIN: 0
NAUSEA: 0
SINUS PAIN: 0
COLOR CHANGE: 0
DIARRHEA: 0
RHINORRHEA: 0

## 2021-03-26 ASSESSMENT — PAIN DESCRIPTION - FREQUENCY: FREQUENCY: CONTINUOUS

## 2021-03-26 ASSESSMENT — PAIN DESCRIPTION - DESCRIPTORS: DESCRIPTORS: THROBBING

## 2021-03-26 ASSESSMENT — PAIN DESCRIPTION - PAIN TYPE: TYPE: ACUTE PAIN

## 2021-03-26 ASSESSMENT — PAIN DESCRIPTION - ORIENTATION: ORIENTATION: LEFT

## 2021-03-26 NOTE — ED PROVIDER NOTES
[address] ED  EMERGENCY DEPARTMENT ENCOUNTER      Pt Name: Arlin Navarrete  MRN: [id number]  [place] 1970  Date of evaluation: 3/26/2021  Provider: Vicki Sousa PA-C    CHIEF COMPLAINT       Chief Complaint   Patient presents with    Eye Problem     pt c/o left eye redness, edema and left ear/facial pain, denies trauma         HISTORY OF PRESENT ILLNESS   (Location/Symptom, Timing/Onset, Context/Setting, Quality, Duration, Modifying Factors, Severity)  Note limiting factors. Arlin Navarrete is a 45 y.o. female who presents to the emergency department for complaint of left upper eyelid irritation, erythema and edema along with pain and swollen lymph node just proximal to her left ear. Patient states that she was walking out of the store felt like she was bit on her eyelid couple days ago. Since then she has had some erythema for which she is treated at home and now she is having some swelling just proximal to her left ear. She says is painful and that when she rubbed it yesterday she could feel which she thought was liquid being pushed away from the area of edema. She denies fever, cough, change in hearing, nausea, vomiting, diarrhea. She denies vision changes. HPI    Nursing Notes were reviewed. REVIEW OF SYSTEMS    (2-9 systems for level 4, 10 or more for level 5)     Review of Systems   Constitutional: Negative for activity change, chills, fatigue and fever. HENT: Positive for ear pain. Negative for congestion, hearing loss, rhinorrhea, sinus pain, sneezing and tinnitus. Swelling just proximal to patient's left ear. Swelling to upper eyelid. Eyes: Negative for discharge, redness and visual disturbance. Respiratory: Negative for cough, chest tightness and shortness of breath. Cardiovascular: Negative for chest pain and leg swelling. Gastrointestinal: Negative for abdominal pain, diarrhea, nausea and vomiting.    Endocrine: Negative for heat intolerance, polydipsia and polyuria. Genitourinary: Negative for dysuria, flank pain, hematuria and urgency. Musculoskeletal: Negative for back pain, joint swelling and myalgias. Skin: Negative for color change, rash and wound. Allergic/Immunologic: Negative for immunocompromised state. Neurological: Negative for tremors, seizures, syncope, light-headedness and headaches. Hematological: Does not bruise/bleed easily. Psychiatric/Behavioral: Negative for agitation and behavioral problems. The patient is not nervous/anxious. Except as noted above the remainder of the review of systems was reviewed and negative.        PAST MEDICAL HISTORY     Past Medical History:   Diagnosis Date    Cancer [facility])     colon    Chronic back pain     GERD (gastroesophageal reflux disease)     Hypertension     Neck pain     Osteoarthritis     Sleep apnea          SURGICAL HISTORY       Past Surgical History:   Procedure Laterality Date    COLONOSCOPY      HYSTERECTOMY, TOTAL ABDOMINAL           CURRENT MEDICATIONS       Previous Medications    ALBUTEROL SULFATE HFA (VENTOLIN HFA) 108 (90 BASE) MCG/ACT INHALER    Inhale 1 puff into the lungs every 6 hours as needed for Wheezing or Shortness of Breath    BACLOFEN (LIORESAL) 10 MG TABLET    TAKE 1 TABLET BY MOUTH TWICE DAILY AS NEEDED MUSCLE  PAIN  OR  SPASMS (Hold for sedation, no driving or operating machinery while using these)    BLOOD PRESSURE KIT    Dx: HTN    FEXOFENADINE (ALLEGRA) 180 MG TABLET    Take 1 tablet by mouth daily as needed (ALLERGIES)    IPRATROPIUM (ATROVENT) 0.06 % NASAL SPRAY        LOSARTAN (COZAAR) 25 MG TABLET    Take 1 tablet by mouth daily    METOPROLOL SUCCINATE (TOPROL XL) 50 MG EXTENDED RELEASE TABLET    Take 1 tablet by mouth daily       ALLERGIES     Lisinopril, Morphine, and Tramadol    FAMILY HISTORY       Family History   Problem Relation Age of Onset    High Blood Pressure Mother     High Cholesterol Mother     Cervical Cancer Mother s/p cryosurgery    High Cholesterol Father     High Blood Pressure Father     Diabetes Father     Rectal Cancer Father     High Blood Pressure Maternal Aunt     High Cholesterol Maternal Aunt     Other Maternal Aunt     Diabetes Maternal Uncle     Prostate Cancer Maternal Uncle     Stomach Cancer Maternal Uncle     Other Maternal Grandmother     Prostate Cancer Maternal Grandfather     Stomach Cancer Paternal Aunt           SOCIAL HISTORY       Social History     Socioeconomic History    Marital status:      Spouse name: None    Number of children: None    Years of education: None    Highest education level: None   Occupational History    None   Social Needs    Financial resource strain: None    Food insecurity     Worry: None     Inability: None    Transportation needs     Medical: None     Non-medical: None   Tobacco Use    Smoking status: Never Smoker    Smokeless tobacco: Never Used   Substance and Sexual Activity    Alcohol use: No    Drug use: No    Sexual activity: Yes     Partners: Male     Comment:     Lifestyle    Physical activity     Days per week: None     Minutes per session: None    Stress: None   Relationships    Social connections     Talks on phone: None     Gets together: None     Attends Islam service: None     Active member of club or organization: None     Attends meetings of clubs or organizations: None     Relationship status: None    Intimate partner violence     Fear of current or ex partner: None     Emotionally abused: None     Physically abused: None     Forced sexual activity: None   Other Topics Concern    None   Social History Narrative    None       SCREENINGS                        PHYSICAL EXAM    (up to 7 for level 4, 8 or more for level 5)     ED Triage Vitals [03/26/21 1748]   BP Temp Temp Source Pulse Resp SpO2 Height Weight   (!) 180/91 97.2 °F (36.2 °C) Temporal 79 16 97 % 5' 8\" (1.727 m) 190 lb (86.2 kg)       Physical Exam  Vitals signs and nursing note reviewed. Constitutional:       General: She is not in acute distress. Appearance: Normal appearance. She is normal weight. HENT:      Head: Normocephalic. Comments: Swollen lymph node just proximal to the patient's left ear. Right Ear: Tympanic membrane, ear canal and external ear normal.      Left Ear: Tympanic membrane, ear canal and external ear normal.      Nose: Nose normal.      Mouth/Throat:      Mouth: Mucous membranes are moist.      Pharynx: Oropharynx is clear. No oropharyngeal exudate or posterior oropharyngeal erythema. Eyes:      Conjunctiva/sclera: Conjunctivae normal.      Pupils: Pupils are equal, round, and reactive to light. Comments: Minor erythema to the left upper eyelid. No discharge, no hordeolum. Sclera is not injected there is no subconjunctival hemorrhage. There is no discharge. Neck:      Musculoskeletal: Normal range of motion. No neck rigidity. Cardiovascular:      Rate and Rhythm: Normal rate and regular rhythm. Pulses: Normal pulses. Heart sounds: Normal heart sounds. No murmur. No friction rub. Pulmonary:      Effort: Pulmonary effort is normal. No respiratory distress. Breath sounds: Normal breath sounds. No stridor. Abdominal:      General: Abdomen is flat. Bowel sounds are normal.      Palpations: Abdomen is soft. Genitourinary:     Vagina: No vaginal discharge. Musculoskeletal: Normal range of motion. General: No swelling or tenderness. Neurological:      General: No focal deficit present. Mental Status: She is alert.    Psychiatric:         Mood and Affect: Mood normal.         Behavior: Behavior normal.         DIAGNOSTIC RESULTS     EKG: All EKG's are interpreted by the Emergency Department Physician who either signs or Co-signs this chart in the absence of a cardiologist.        RADIOLOGY:   Non-plain film images such as CT, Ultrasound and MRI are read by the radiologist. Corrie Ximena radiographic images are visualized and preliminarily interpreted by the emergency physician with the below findings:        Interpretation per the Radiologist below, if available at the time of this note:    No orders to display         ED BEDSIDE ULTRASOUND:   Performed by ED Physician - none    LABS:  Labs Reviewed - No data to display    All other labs were within normal range or not returned as of this dictation. EMERGENCY DEPARTMENT COURSE and DIFFERENTIAL DIAGNOSIS/MDM:   Vitals:    Vitals:    03/26/21 1748   BP: (!) 180/91   Pulse: 79   Resp: 16   Temp: 97.2 °F (36.2 °C)   TempSrc: Temporal   SpO2: 97%   Weight: 190 lb (86.2 kg)   Height: 5' 8\" (1.727 m)       53-year of age female who presents with left-sided lymphadenopathy and irritation and edema to the left upper eyelid. Patient's physical exam is notable for some lymphadenopathy. Left upper eyelid is minimally edematous. Patient will be given prednisone here discharged home with some eyedrops for her left eye advised to apply warm compress 3-4 times a day to her left eye and a Medrol dose pack. Patient's questions were answered. Patient discharged home in stable condition. MDM  Number of Diagnoses or Management Options        REASSESSMENT              CONSULTS:  None    PROCEDURES:  Unless otherwise noted below, none     Procedures      FINAL IMPRESSION      1. Lymphangitis          DISPOSITION/PLAN   DISPOSITION Discharge - Pending Orders Complete 03/26/2021 06:09:50 PM      PATIENT REFERRED TO:  Spencer Tabor MD  [id number] [phone]    In 3 days  As needed      DISCHARGE MEDICATIONS:  New Prescriptions    METHYLPREDNISOLONE (MEDROL, MEGA,) 4 MG TABLET    Take by mouth. TOBRAMYCIN (TOBREX) 0.3 % OPHTHALMIC SOLUTION    Place 1 drop into the right eye every 4 hours for 10 days     Controlled Substances Monitoring:     No flowsheet data found.     (Please note that portions of this note were completed with a voice recognition program.  Efforts were made to edit the dictations but occasionally words are mis-transcribed.)    Rama Shah PA-C (electronically signed)             Rama Shah PA-C  03/26/21 8139

## 2021-03-26 NOTE — ED TRIAGE NOTES
Pt c/o left eye redness, edema and left ear/face pain, denies trauma, Pt is A&OX3, calm, ambulatory, afebrile, breathes are equal and unlabored.

## 2021-04-02 ENCOUNTER — TELEPHONE (OUTPATIENT)
Dept: FAMILY MEDICINE CLINIC | Age: 51
End: 2021-04-02

## 2021-04-02 NOTE — TELEPHONE ENCOUNTER
Patient contacted for Virtual Visit for BP check. States she has home BP monitor but would like an in-office follow-up appt as she feels she is going through menopause issues with racing HR, and would like to compare her BP home kit with office reading. Also states she wants her eye re-checked post infection txt.  30 minute OV scheduled for Wed 04/14/21 @ 4pm.

## 2021-04-19 ENCOUNTER — HOSPITAL ENCOUNTER (OUTPATIENT)
Dept: NON INVASIVE DIAGNOSTICS | Age: 51
Discharge: HOME | End: 2021-04-19
Payer: COMMERCIAL

## 2021-04-19 DIAGNOSIS — R00.2 PALPITATION: ICD-10-CM

## 2021-04-19 DIAGNOSIS — R00.2 PALPITATIONS: ICD-10-CM

## 2021-04-19 PROCEDURE — 93226 XTRNL ECG REC<48 HR SCAN A/R: CPT

## 2021-04-19 PROCEDURE — 93270 REMOTE 30 DAY ECG REV/REPORT: CPT

## 2021-04-22 ENCOUNTER — NURSE TRIAGE (OUTPATIENT)
Dept: OTHER | Facility: CLINIC | Age: 51
End: 2021-04-22

## 2021-04-22 NOTE — TELEPHONE ENCOUNTER
\"What do you think is causing the headache? \"      Pinched nerve in back      7. MIGRAINE: \"Have you been diagnosed with migraine headaches? \" If so, ask: \"Is this headache similar? \"       Pt states yes    8. HEAD INJURY: \"Has there been any recent injury to the head? \"       Pt denies    9. OTHER SYMPTOMS: \"Do you have any other symptoms? \" (fever, stiff neck, eye pain, sore throat, cold symptoms)     Neck and back pain    10. PREGNANCY: \"Is there any chance you are pregnant? \" \"When was your last menstrual period? \"        Denies, hysterectomy    Protocols used: HEADACHE-ADULT-OH 0 = independent

## 2021-05-11 ENCOUNTER — VIRTUAL VISIT (OUTPATIENT)
Dept: FAMILY MEDICINE CLINIC | Age: 51
End: 2021-05-11
Payer: COMMERCIAL

## 2021-05-11 ENCOUNTER — TELEPHONE (OUTPATIENT)
Dept: FAMILY MEDICINE CLINIC | Age: 51
End: 2021-05-11

## 2021-05-11 DIAGNOSIS — M50.90 CERVICAL DISC DISEASE: ICD-10-CM

## 2021-05-11 DIAGNOSIS — I10 ESSENTIAL HYPERTENSION: ICD-10-CM

## 2021-05-11 DIAGNOSIS — R06.00 DYSPNEA, UNSPECIFIED TYPE: ICD-10-CM

## 2021-05-11 DIAGNOSIS — R20.2 NUMBNESS AND TINGLING IN BOTH HANDS: ICD-10-CM

## 2021-05-11 DIAGNOSIS — G47.30 SLEEP APNEA, UNSPECIFIED TYPE: ICD-10-CM

## 2021-05-11 DIAGNOSIS — R00.2 PALPITATIONS: Primary | ICD-10-CM

## 2021-05-11 DIAGNOSIS — R20.0 NUMBNESS AND TINGLING IN BOTH HANDS: ICD-10-CM

## 2021-05-11 DIAGNOSIS — Z23 ENCOUNTER FOR IMMUNIZATION: ICD-10-CM

## 2021-05-11 PROCEDURE — 99443 PR PHYS/QHP TELEPHONE EVALUATION 21-30 MIN: CPT | Performed by: INTERNAL MEDICINE

## 2021-05-11 RX ORDER — BACLOFEN 10 MG/1
TABLET ORAL
Qty: 60 TABLET | Refills: 2 | Status: SHIPPED | OUTPATIENT
Start: 2021-05-11 | End: 2021-07-28 | Stop reason: ALTCHOICE

## 2021-05-11 RX ORDER — LOSARTAN POTASSIUM 25 MG/1
TABLET ORAL
Qty: 45 TABLET | Refills: 0 | Status: SHIPPED | OUTPATIENT
Start: 2021-05-11 | End: 2021-06-14 | Stop reason: CLARIF

## 2021-05-11 RX ORDER — FEXOFENADINE HCL 180 MG/1
180 TABLET ORAL DAILY PRN
Qty: 90 TABLET | Refills: 3 | Status: SHIPPED | OUTPATIENT
Start: 2021-05-11 | End: 2022-05-26 | Stop reason: CLARIF

## 2021-05-11 RX ORDER — ZOSTER VACCINE RECOMBINANT, ADJUVANTED 50 MCG/0.5
0.5 KIT INTRAMUSCULAR SEE ADMIN INSTRUCTIONS
Qty: 0.5 ML | Refills: 0 | Status: SHIPPED | OUTPATIENT
Start: 2021-05-11 | End: 2021-07-14 | Stop reason: CLARIF

## 2021-05-11 SDOH — ECONOMIC STABILITY: TRANSPORTATION INSECURITY
IN THE PAST 12 MONTHS, HAS THE LACK OF TRANSPORTATION KEPT YOU FROM MEDICAL APPOINTMENTS OR FROM GETTING MEDICATIONS?: NO

## 2021-05-11 SDOH — ECONOMIC STABILITY: TRANSPORTATION INSECURITY
IN THE PAST 12 MONTHS, HAS LACK OF TRANSPORTATION KEPT YOU FROM MEETINGS, WORK, OR FROM GETTING THINGS NEEDED FOR DAILY LIVING?: NO

## 2021-05-11 SDOH — ECONOMIC STABILITY: FOOD INSECURITY: WITHIN THE PAST 12 MONTHS, YOU WORRIED THAT YOUR FOOD WOULD RUN OUT BEFORE YOU GOT MONEY TO BUY MORE.: NEVER TRUE

## 2021-05-11 ASSESSMENT — PATIENT HEALTH QUESTIONNAIRE - PHQ9
1. LITTLE INTEREST OR PLEASURE IN DOING THINGS: 0
SUM OF ALL RESPONSES TO PHQ QUESTIONS 1-9: 0
2. FEELING DOWN, DEPRESSED OR HOPELESS: 0

## 2021-05-11 ASSESSMENT — ENCOUNTER SYMPTOMS
BACK PAIN: 0
SHORTNESS OF BREATH: 0
ABDOMINAL PAIN: 0
EYE PAIN: 0

## 2021-05-11 NOTE — PROGRESS NOTES
Subjective:      Patient ID: Fitz Bhatia is a 45 y.o. female who presents today with:  No chief complaint on file.       HPI    htn  159/88  bp today  alexis  Intermittent palpitations  She ran out of losartan  bp had been 140/80s on 25 mg of losartan  Tingling in both hands  No falls, no trauma  No redness    Past Medical History:   Diagnosis Date    Cancer ([address]     colon    Chronic back pain     GERD (gastroesophageal reflux disease)     Hypertension     Neck pain     Osteoarthritis     Sleep apnea      Past Surgical History:   Procedure Laterality Date    COLONOSCOPY      HYSTERECTOMY, TOTAL ABDOMINAL       Social History     Socioeconomic History    Marital status:      Spouse name: Not on file    Number of children: Not on file    Years of education: Not on file    Highest education level: Not on file   Occupational History    Not on file   Social Needs    Financial resource strain: Not hard at all   ReferralMD insecurity     Worry: Never true     Inability: Never true   [employer] needs     Medical: No     Non-medical: No   Tobacco Use    Smoking status: Never Smoker    Smokeless tobacco: Never Used   Substance and Sexual Activity    Alcohol use: No    Drug use: No    Sexual activity: Yes     Partners: Male     Comment:     Lifestyle    Physical activity     Days per week: Not on file     Minutes per session: Not on file    Stress: Not on file   Relationships    Social connections     Talks on phone: Not on file     Gets together: Not on file     Attends [employer] service: Not on file     Active member of club or organization: Not on file     Attends meetings of clubs or organizations: Not on file     Relationship status: Not on file    Intimate partner violence     Fear of current or ex partner: Not on file     Emotionally abused: Not on file     Physically abused: Not on file     Forced sexual activity: Not on file   Other Topics Concern    Not on file   Social History Narrative    Not on file     Allergies   Allergen Reactions    Lisinopril      Cough     Morphine Other (See Comments)     heart races    Tramadol Other (See Comments)     dizziness, lightheaded, nausea     Current Outpatient Medications on File Prior to Visit   Medication Sig Dispense Refill    metoprolol succinate (TOPROL XL) 50 MG extended release tablet Take 1 tablet by mouth daily 90 tablet 3    ipratropium (ATROVENT) 0.06 % nasal spray       Blood Pressure KIT Dx: HTN 1 kit 0     No current facility-administered medications on file prior to visit. I have personally reviewed the ROS, PMH, PFH, and social history     Review of Systems   Constitutional: Negative for chills and fever. HENT: Negative for congestion. Eyes: Negative for pain. Respiratory: Negative for shortness of breath. Cardiovascular: Negative for chest pain. Gastrointestinal: Negative for abdominal pain. Genitourinary: Negative for hematuria. Musculoskeletal: Negative for back pain. Tingling in hands. Allergic/Immunologic: Negative for immunocompromised state. Neurological: Negative for headaches. Psychiatric/Behavioral: Negative for hallucinations. Objective: There were no vitals taken for this visit. Physical Exam    Unable to perform given telephone visit. Assessment:       Diagnosis Orders   1. Palpitations     2. Dyspnea, unspecified type     3. Sleep apnea, unspecified type     4. Essential hypertension     5. Chronic low back pain  baclofen (LIORESAL) 10 MG tablet   6. Cervical disc disease  baclofen (LIORESAL) 10 MG tablet   7. Encounter for immunization  zoster recombinant adjuvanted vaccine [facility]) 50 MCG/0.5ML SUSR injection   8. Numbness and tingling in both hands  EMG         Plan:    Telephone visit done because of coronavirus pandemic.    Time spent 22 minutes   explained billeable visit, patient understands and agrees to continue  I was at home and patient was at home during this visit. Was referred to neuro and cards. Pending echo (not done)  Pending nuclear stress test (not done)   Needs to holter asap. (apparently has to be redone)   Never followed up with allergy. Mod sleep apnea. Restart losartan at 1.5 tablets po once daily. Orders from 1/15/2021 not done yet.                  Orders Placed This Encounter   Procedures    EMG     Standing Status:   Future     Standing Expiration Date:   7/10/2021     Order Specific Question:   Which body part? Answer:   bl upper extremities     Orders Placed This Encounter   Medications    losartan (COZAAR) 25 MG tablet     Sig: Take 1.5 tablets po once daily     Dispense:  45 tablet     Refill:  0    fexofenadine (ALLEGRA) 180 MG tablet     Sig: Take 1 tablet by mouth daily as needed (ALLERGIES)     Dispense:  90 tablet     Refill:  3    baclofen (LIORESAL) 10 MG tablet     Sig: TAKE 1 TABLET BY MOUTH TWICE DAILY AS NEEDED MUSCLE  PAIN  OR  SPASMS (Hold for sedation, no driving or operating machinery while using these)     Dispense:  60 tablet     Refill:  2    zoster recombinant adjuvanted vaccine (SHINGRIX) 50 MCG/0.5ML SUSR injection     Sig: Inject 0.5 mLs into the muscle See Admin Instructions 1 dose now and repeat in 2-6 months     Dispense:  0.5 mL     Refill:  0   MA visit in 5 to 7 days for bp check. Call in 2 to 3 days if you don't hear from me after the bp check. Please get bw done. Didn't want preg test.   Risk of birth defects. Refused covid vaccine    Denies depression. Most likely cts. Risk of permanent nerve damage  See ortho hand. Any s/s of hypotension stop taking and call immediately. If anything should change or worsen call ASAP, don't wait for next scheduled appointment. Return in about 6 weeks (around 2021) for Chronic condition management/appointment, regularly scheduled appointment with PCP.       Alfa Brunner MD

## 2021-05-17 ENCOUNTER — TELEPHONE (OUTPATIENT)
Dept: CARDIOLOGY CLINIC | Age: 51
End: 2021-05-17

## 2021-05-17 DIAGNOSIS — R94.31 ABNORMAL EKG: Primary | ICD-10-CM

## 2021-05-17 DIAGNOSIS — I20.8 ANGINA AT REST (HCC): ICD-10-CM

## 2021-05-18 ENCOUNTER — TELEPHONE (OUTPATIENT)
Dept: FAMILY MEDICINE CLINIC | Age: 51
End: 2021-05-18

## 2021-05-19 ENCOUNTER — TELEPHONE (OUTPATIENT)
Dept: FAMILY MEDICINE CLINIC | Age: 51
End: 2021-05-19

## 2021-05-26 NOTE — PROCEDURES
[address]                      [address], [address] [facility]                                 HOLTER MONITOR    PATIENT NAME: Kelsie Wilson                  :        1970  MED REC NO:   [id number]                            ROOM:  ACCOUNT NO:   [de-identified]                           ADMIT DATE: 2021  PROVIDER:     Louise Ibarra MD    HOLTER MONITOR 48-HOURS    DATE OF STUDY:  2021    REFERRING PROVIDER:  Fermin Guadalupe MD    INDICATION:  Palpitation. COMMENTS:  The patient was monitored for 48 hours. Underlying rhythm is  normal sinus. Maximum heart rate achieved was 107 beats per minute. Minimum was 47 beats per minute. There were 1429 PVCs and 66 PACs. These were all isolated. There were no runs of malignant ventricular or  supraventricular tachyarrhythmias. No significant pauses were seen.         Jackson Chong MD    D: 2021 17:21:52       T: 2021 3:10:47     [id number]  Job#: [id number]     Doc#: [id number]    CC:

## 2021-06-14 ENCOUNTER — OFFICE VISIT (OUTPATIENT)
Dept: FAMILY MEDICINE CLINIC | Age: 51
End: 2021-06-14
Payer: COMMERCIAL

## 2021-06-14 VITALS
WEIGHT: 193 LBS | BODY MASS INDEX: 29.25 KG/M2 | RESPIRATION RATE: 15 BRPM | HEART RATE: 72 BPM | OXYGEN SATURATION: 98 % | DIASTOLIC BLOOD PRESSURE: 80 MMHG | HEIGHT: 68 IN | SYSTOLIC BLOOD PRESSURE: 146 MMHG | TEMPERATURE: 98.1 F

## 2021-06-14 DIAGNOSIS — I49.3 PVC (PREMATURE VENTRICULAR CONTRACTION): ICD-10-CM

## 2021-06-14 DIAGNOSIS — I49.1 PAC (PREMATURE ATRIAL CONTRACTION): ICD-10-CM

## 2021-06-14 DIAGNOSIS — I49.3 PVC (PREMATURE VENTRICULAR CONTRACTION): Primary | ICD-10-CM

## 2021-06-14 DIAGNOSIS — I10 ESSENTIAL HYPERTENSION: ICD-10-CM

## 2021-06-14 DIAGNOSIS — E55.9 VITAMIN D DEFICIENCY: ICD-10-CM

## 2021-06-14 DIAGNOSIS — Z11.4 SCREENING FOR HIV (HUMAN IMMUNODEFICIENCY VIRUS): ICD-10-CM

## 2021-06-14 DIAGNOSIS — R23.2 HOT FLASHES: ICD-10-CM

## 2021-06-14 DIAGNOSIS — R00.2 PALPITATIONS: ICD-10-CM

## 2021-06-14 DIAGNOSIS — Z11.59 ENCOUNTER FOR HEPATITIS C SCREENING TEST FOR LOW RISK PATIENT: ICD-10-CM

## 2021-06-14 DIAGNOSIS — G47.30 SLEEP APNEA, UNSPECIFIED TYPE: ICD-10-CM

## 2021-06-14 DIAGNOSIS — Z11.59 NEED FOR HEPATITIS C SCREENING TEST: ICD-10-CM

## 2021-06-14 DIAGNOSIS — L30.9 DERMATITIS: ICD-10-CM

## 2021-06-14 LAB
ALBUMIN SERPL-MCNC: 4.5 G/DL (ref 3.5–4.6)
ALP BLD-CCNC: 66 U/L (ref 40–130)
ALT SERPL-CCNC: 10 U/L (ref 0–33)
ANION GAP SERPL CALCULATED.3IONS-SCNC: 8 MEQ/L (ref 9–15)
AST SERPL-CCNC: 15 U/L (ref 0–35)
BASOPHILS ABSOLUTE: 0 K/UL (ref 0–0.2)
BASOPHILS RELATIVE PERCENT: 0.4 %
BILIRUB SERPL-MCNC: 0.8 MG/DL (ref 0.2–0.7)
BUN BLDV-MCNC: 9 MG/DL (ref 6–20)
CALCIUM SERPL-MCNC: 9.8 MG/DL (ref 8.5–9.9)
CHLORIDE BLD-SCNC: 102 MEQ/L (ref 95–107)
CHOLESTEROL, TOTAL: 170 MG/DL (ref 0–199)
CO2: 25 MEQ/L (ref 20–31)
CREAT SERPL-MCNC: 0.43 MG/DL (ref 0.5–0.9)
EOSINOPHILS ABSOLUTE: 0.2 K/UL (ref 0–0.7)
EOSINOPHILS RELATIVE PERCENT: 2.2 %
FOLLICLE STIMULATING HORMONE: 8.1 MIU/ML
GFR AFRICAN AMERICAN: >60
GFR NON-AFRICAN AMERICAN: >60
GLOBULIN: 2.9 G/DL (ref 2.3–3.5)
GLUCOSE BLD-MCNC: 115 MG/DL (ref 70–99)
HBA1C MFR BLD: 5.6 % (ref 4.8–5.9)
HCT VFR BLD CALC: 38.9 % (ref 37–47)
HDLC SERPL-MCNC: 47 MG/DL (ref 40–59)
HEMOGLOBIN: 13.6 G/DL (ref 12–16)
HEPATITIS C ANTIBODY INTERPRETATION: NORMAL
LDL CHOLESTEROL CALCULATED: 103 MG/DL (ref 0–129)
LUTEINIZING HORMONE: 4.4 MIU/ML
LYMPHOCYTES ABSOLUTE: 2.2 K/UL (ref 1–4.8)
LYMPHOCYTES RELATIVE PERCENT: 30.6 %
MAGNESIUM: 1.9 MG/DL (ref 1.7–2.4)
MCH RBC QN AUTO: 30.6 PG (ref 27–31.3)
MCHC RBC AUTO-ENTMCNC: 35 % (ref 33–37)
MCV RBC AUTO: 87.5 FL (ref 82–100)
MONOCYTES ABSOLUTE: 0.6 K/UL (ref 0.2–0.8)
MONOCYTES RELATIVE PERCENT: 8.1 %
NEUTROPHILS ABSOLUTE: 4.2 K/UL (ref 1.4–6.5)
NEUTROPHILS RELATIVE PERCENT: 58.7 %
PDW BLD-RTO: 13.4 % (ref 11.5–14.5)
PHOSPHORUS: 2.5 MG/DL (ref 2.3–4.8)
PLATELET # BLD: 256 K/UL (ref 130–400)
POTASSIUM SERPL-SCNC: 3.9 MEQ/L (ref 3.4–4.9)
RBC # BLD: 4.45 M/UL (ref 4.2–5.4)
SODIUM BLD-SCNC: 135 MEQ/L (ref 135–144)
TOTAL PROTEIN: 7.4 G/DL (ref 6.3–8)
TRIGL SERPL-MCNC: 100 MG/DL (ref 0–150)
TSH REFLEX: 0.57 UIU/ML (ref 0.44–3.86)
VITAMIN D 25-HYDROXY: 16.2 NG/ML (ref 30–100)
WBC # BLD: 7.2 K/UL (ref 4.8–10.8)

## 2021-06-14 PROCEDURE — 99214 OFFICE O/P EST MOD 30 MIN: CPT | Performed by: INTERNAL MEDICINE

## 2021-06-14 RX ORDER — ASPIRIN 81 MG/1
81 TABLET, CHEWABLE ORAL DAILY
COMMUNITY
End: 2022-08-31 | Stop reason: CLARIF

## 2021-06-14 RX ORDER — MOMETASONE FUROATE 1 MG/G
CREAM TOPICAL
Qty: 45 G | Refills: 0 | Status: SHIPPED | OUTPATIENT
Start: 2021-06-14 | End: 2021-08-12

## 2021-06-14 RX ORDER — LOSARTAN POTASSIUM 50 MG/1
50 TABLET ORAL DAILY
Qty: 30 TABLET | Refills: 0 | Status: SHIPPED | OUTPATIENT
Start: 2021-06-14 | End: 2021-07-14 | Stop reason: DRUGHIGH

## 2021-06-14 RX ORDER — IPRATROPIUM BROMIDE 42 UG/1
1 SPRAY, METERED NASAL EVERY 8 HOURS PRN
Qty: 1 BOTTLE | Refills: 11 | Status: SHIPPED | OUTPATIENT
Start: 2021-06-14 | End: 2022-05-26 | Stop reason: CLARIF

## 2021-06-14 ASSESSMENT — ENCOUNTER SYMPTOMS
SHORTNESS OF BREATH: 0
EYE PAIN: 0
BACK PAIN: 0
ABDOMINAL PAIN: 0

## 2021-06-14 NOTE — PROGRESS NOTES
Subjective:      Patient ID: Dennis Espinoza is a 45 y.o. female who presents today with:  Chief Complaint   Patient presents with    Follow-up    Discuss Labs    Hypertension     meds need to be adjusted per patient request     Rash     patient has a few spots on her legs of discoloration        HPI    Restart losartan at 1.5 tablets po once daily. LAST VISIT  She is taking the dose though  She feels great though  Rash a fwe spot on her legs. Female sex  No si/hi  PVC  AND PAC  Seen on holter  Uncontrolled htn. Past Medical History:   Diagnosis Date    Cancer ([address]     colon    Chronic back pain     GERD (gastroesophageal reflux disease)     Hypertension     Neck pain     Osteoarthritis     Sleep apnea      Past Surgical History:   Procedure Laterality Date    COLONOSCOPY      HYSTERECTOMY, TOTAL ABDOMINAL       Social History     Socioeconomic History    Marital status:      Spouse name: Not on file    Number of children: Not on file    Years of education: Not on file    Highest education level: Not on file   Occupational History    Not on file   Tobacco Use    Smoking status: Never Smoker    Smokeless tobacco: Never Used   Vaping Use    Vaping Use: Never used   Substance and Sexual Activity    Alcohol use: No    Drug use: No    Sexual activity: Yes     Partners: Male     Comment:     Other Topics Concern    Not on file   Social History Narrative    Not on file     Social Determinants of Health     Financial Resource Strain: Low Risk     Difficulty of Paying Living Expenses: Not hard at all   Food Insecurity: No Food Insecurity    Worried About Running Out of Food in the Last Year: Never true    Rashard of Food in the Last Year: Never true   Transportation Needs: No Transportation Needs    Lack of Transportation (Medical): No    Lack of Transportation (Non-Medical):  No   Physical Activity:     Days of Exercise per Week:     Minutes of Exercise per Session: Stress:     Feeling of Stress :    Social Connections:     Frequency of Communication with Friends and Family:     Frequency of Social Gatherings with Friends and Family:     Attends Evangelical Services:     Active Member of Clubs or Organizations:     Attends Club or Organization Meetings:     Marital Status:    Intimate Partner Violence:     Fear of Current or Ex-Partner:     Emotionally Abused:     Physically Abused:     Sexually Abused: Allergies   Allergen Reactions    Lisinopril      Cough     Morphine Other (See Comments)     heart races    Tramadol Other (See Comments)     dizziness, lightheaded, nausea     Current Outpatient Medications on File Prior to Visit   Medication Sig Dispense Refill    aspirin 81 MG chewable tablet Take 81 mg by mouth daily      fexofenadine (ALLEGRA) 180 MG tablet Take 1 tablet by mouth daily as needed (ALLERGIES) 90 tablet 3    baclofen (LIORESAL) 10 MG tablet TAKE 1 TABLET BY MOUTH TWICE DAILY AS NEEDED MUSCLE  PAIN  OR  SPASMS (Hold for sedation, no driving or operating machinery while using these) 60 tablet 2    metoprolol succinate (TOPROL XL) 50 MG extended release tablet Take 1 tablet by mouth daily 90 tablet 3    Blood Pressure KIT Dx: HTN 1 kit 0    zoster recombinant adjuvanted vaccine (SHINGRIX) 50 MCG/0.5ML SUSR injection Inject 0.5 mLs into the muscle See Admin Instructions 1 dose now and repeat in 2-6 months (Patient not taking: Reported on 6/14/2021) 0.5 mL 0     No current facility-administered medications on file prior to visit. I have personally reviewed the ROS, PMH, PFH, and social history     Review of Systems   Constitutional: Negative for chills and fever. HENT: Negative for congestion. Eyes: Negative for pain. Respiratory: Negative for shortness of breath. Cardiovascular: Negative for chest pain. Gastrointestinal: Negative for abdominal pain. Genitourinary: Negative for hematuria.    Musculoskeletal: Negative for back pain. Allergic/Immunologic: Negative for immunocompromised state. Neurological: Negative for headaches. Psychiatric/Behavioral: Negative for hallucinations. Objective:   BP (!) 146/80   Pulse 72   Temp 98.1 °F (36.7 °C) (Tympanic)   Resp 15   Ht 5' 8\" (1.727 m)   Wt 193 lb (87.5 kg)   SpO2 98%   BMI 29.35 kg/m²     Physical Exam  Constitutional:       General: She is not in acute distress. Appearance: Normal appearance. She is not ill-appearing, toxic-appearing or diaphoretic. HENT:      Head: Normocephalic. Neck:      Vascular: No carotid bruit. Cardiovascular:      Rate and Rhythm: Normal rate and regular rhythm. Pulses: Normal pulses. Heart sounds: Normal heart sounds. No murmur heard. No friction rub. No gallop. Pulmonary:      Effort: Pulmonary effort is normal. No respiratory distress. Breath sounds: Normal breath sounds. No wheezing, rhonchi or rales. Abdominal:      General: Abdomen is flat. There is no distension. Palpations: Abdomen is soft. Tenderness: There is no abdominal tenderness. There is no right CVA tenderness, left CVA tenderness, guarding or rebound. Musculoskeletal:      Cervical back: Neck supple. Right lower leg: No edema. Left lower leg: No edema. Skin:     General: Skin is warm. Findings: Rash (several brown macules over lower extrmiities, several mm in size, total of 3 or 4)  present. No erythema. Neurological:      Mental Status: She is alert. Sensory: Sensory deficit (decreased sensation to touch and prick on plantar aspect of right  distal upper extremity)  present. Motor: No weakness. Deep Tendon Reflexes: Reflexes normal.   Psychiatric:         Mood and Affect: Mood normal.           Assessment:       Diagnosis Orders   1. PVC (premature ventricular contraction)  Allen Llamas MD, Invasive Cardiology, [place]    Phosphorus    Hepatitis C Antibody    HIV Screen   2.  PAC tablet     Refill:  0    ipratropium (ATROVENT) 0.06 % nasal spray     Si spray by Nasal route every 8 hours as needed for Rhinitis     Dispense:  1 Bottle     Refill:  11    mometasone (ELOCON) 0.1 % cream     Sig: Apply topically daily to rash on legs     Dispense:  45 g     Refill:  0   MA visit in 5 to 7 days for bp check. Call in 2 to 3 days if you don't hear from me after the bp check. Please do labs. Never followed up with allergy. Any s/s of hypotension stop taking and call immediately. If you get cp, ha, vision changes, dyspnea, arm pain, etc go to ER. (Risk of uncontrolled HTN)   Explained not to use steroid cream on thin skin including to but not limited to face, genitals, etc as it can cause skin thinning and permanent damage   If rash doesn't improve in 2 weeks call, Sooner if it worsens. Refusing covid vaccine    If anything should change or worsen call ASAP, don't wait for next scheduled appointment. Return in about 4 weeks (around 2021) for Chronic condition management/appointment, worsening symptoms, call ASAP for appointment.       Kyle Arredondo MD

## 2021-06-15 LAB — HIV AG/AB: NONREACTIVE

## 2021-06-16 ENCOUNTER — HOSPITAL ENCOUNTER (OUTPATIENT)
Dept: NON INVASIVE DIAGNOSTICS | Age: 51
Discharge: HOME | End: 2021-06-16
Payer: COMMERCIAL

## 2021-06-16 DIAGNOSIS — R94.31 ABNORMAL EKG: ICD-10-CM

## 2021-06-16 DIAGNOSIS — I20.8 ANGINA AT REST (HCC): ICD-10-CM

## 2021-06-16 PROCEDURE — 93017 CV STRESS TEST TRACING ONLY: CPT

## 2021-06-16 PROCEDURE — 93018 CV STRESS TEST I&R ONLY: CPT | Performed by: INTERNAL MEDICINE

## 2021-06-16 NOTE — PROCEDURES
[address]                      [address], [address] [facility]                              CARDIAC STRESS TEST    PATIENT NAME: Yanick Vidal                  :        1970  MED REC NO:   [id number]                            ROOM:  ACCOUNT NO:   [de-identified]                           ADMIT DATE: 2021  PROVIDER:     Ellen Acuna MD    CARDIOVASCULAR DIAGNOSTIC DEPARTMENT    DATE OF STUDY:  2021    STRESS TEST    ORDERING PROVIDERS:  Alexey Goldstein. Diane Last MD and Mariposa Clifton MD    INDICATIONS:  Chest pain. TECHNIQUE:  The patient was exercised according to Paul protocol. She  exercised for 4.5 minutes achieving a heart rate of 150, which is 88% of  predicted maximum heart rate. Total METs achieved was 7, which is  slightly reduced. Occasional PVCs were seen. No diagnostic ST-segment  changes were noted. No arrhythmias were seen, except occasional PVCs. Blood pressure response was hypertensive with a maximum systolic blood  pressure 933 mmHg. IMPRESSION:  1. Reduced exercise tolerance. 2.  Hypertensive response. 3.  Occasional PVCs. 4.  No definitive evidence of ischemia by EKG criteria.         Tyrese Nicholson MD    D: 2021 #16:48:02       T: 2021 18:16:19     [id number]  Job#: [id number]     Doc#: [id number]    CC:

## 2021-06-16 NOTE — PROGRESS NOTES
Hx,allergies and medications reviewed. Procedure explained and informed consent obtained. Tolerated treadmill well for minutes. Reached 88 % target HR. SOB noted. Returned to baseline in recovery. Denies chest pain or pressure.     EKG shows frequent pvc's in hr of 120 to 130bpm.

## 2021-06-23 ENCOUNTER — TELEPHONE (OUTPATIENT)
Dept: NEUROSURGERY | Age: 51
End: 2021-06-23

## 2021-07-07 ENCOUNTER — TELEPHONE (OUTPATIENT)
Dept: FAMILY MEDICINE CLINIC | Age: 51
End: 2021-07-07

## 2021-07-13 ENCOUNTER — HOSPITAL ENCOUNTER (OUTPATIENT)
Dept: WOMENS IMAGING | Age: 51
Discharge: HOME | End: 2021-07-15
Payer: COMMERCIAL

## 2021-07-13 DIAGNOSIS — Z12.31 ENCOUNTER FOR SCREENING MAMMOGRAM FOR MALIGNANT NEOPLASM OF BREAST: ICD-10-CM

## 2021-07-13 PROCEDURE — 77067 SCR MAMMO BI INCL CAD: CPT

## 2021-07-14 ENCOUNTER — OFFICE VISIT (OUTPATIENT)
Dept: FAMILY MEDICINE CLINIC | Age: 51
End: 2021-07-14
Payer: COMMERCIAL

## 2021-07-14 VITALS
HEART RATE: 65 BPM | SYSTOLIC BLOOD PRESSURE: 170 MMHG | WEIGHT: 191 LBS | DIASTOLIC BLOOD PRESSURE: 90 MMHG | RESPIRATION RATE: 15 BRPM | TEMPERATURE: 98.1 F | BODY MASS INDEX: 28.95 KG/M2 | HEIGHT: 68 IN | OXYGEN SATURATION: 97 %

## 2021-07-14 DIAGNOSIS — E55.9 VITAMIN D DEFICIENCY: ICD-10-CM

## 2021-07-14 DIAGNOSIS — Z12.73 SCREENING FOR OVARIAN CANCER: ICD-10-CM

## 2021-07-14 DIAGNOSIS — L03.213 PRESEPTAL CELLULITIS OF RIGHT EYE: Primary | ICD-10-CM

## 2021-07-14 DIAGNOSIS — I10 ESSENTIAL HYPERTENSION: ICD-10-CM

## 2021-07-14 DIAGNOSIS — Z12.72 SCREENING FOR VAGINAL CANCER: ICD-10-CM

## 2021-07-14 PROCEDURE — 99214 OFFICE O/P EST MOD 30 MIN: CPT | Performed by: INTERNAL MEDICINE

## 2021-07-14 PROCEDURE — 99173 VISUAL ACUITY SCREEN: CPT | Performed by: INTERNAL MEDICINE

## 2021-07-14 RX ORDER — ERGOCALCIFEROL 1.25 MG/1
50000 CAPSULE ORAL WEEKLY
Qty: 8 CAPSULE | Refills: 0 | Status: SHIPPED | OUTPATIENT
Start: 2021-07-14 | End: 2022-05-26 | Stop reason: CLARIF

## 2021-07-14 RX ORDER — AMOXICILLIN AND CLAVULANATE POTASSIUM 875; 125 MG/1; MG/1
1 TABLET, FILM COATED ORAL 2 TIMES DAILY
Qty: 14 TABLET | Refills: 0 | Status: SHIPPED | OUTPATIENT
Start: 2021-07-14 | End: 2021-07-21

## 2021-07-14 RX ORDER — SULFAMETHOXAZOLE AND TRIMETHOPRIM 800; 160 MG/1; MG/1
1 TABLET ORAL 2 TIMES DAILY
Qty: 14 TABLET | Refills: 0 | Status: SHIPPED | OUTPATIENT
Start: 2021-07-14 | End: 2021-07-21

## 2021-07-14 RX ORDER — LOSARTAN POTASSIUM 100 MG/1
100 TABLET ORAL DAILY
Qty: 30 TABLET | Refills: 0 | Status: SHIPPED | OUTPATIENT
Start: 2021-07-14 | End: 2021-08-12 | Stop reason: SDUPTHER

## 2021-07-14 ASSESSMENT — ENCOUNTER SYMPTOMS
SHORTNESS OF BREATH: 0
EYE PAIN: 0
ABDOMINAL PAIN: 0
BACK PAIN: 0

## 2021-07-14 NOTE — PROGRESS NOTES
Subjective:      Patient ID: Milena Mercedes is a 45 y.o. female who presents today with:  Chief Complaint   Patient presents with    Follow-up    Discuss Labs    Facial Swelling     right eye, mentions headache and numbness to rightside of head        HPI    Swelling on the right eyelid, she is using tobramycin. Started 2 days ago  Feels a bump on the top of her right eyelid. No falls, no trauma. About the same. But the pain is better. Past Medical History:   Diagnosis Date    Cancer ([address]     colon    Chronic back pain     GERD (gastroesophageal reflux disease)     Hypertension     Neck pain     Osteoarthritis     Sleep apnea      Past Surgical History:   Procedure Laterality Date    BREAST BIOPSY      COLONOSCOPY      HYSTERECTOMY, TOTAL ABDOMINAL       Social History     Socioeconomic History    Marital status:      Spouse name: Not on file    Number of children: Not on file    Years of education: Not on file    Highest education level: Not on file   Occupational History    Not on file   Tobacco Use    Smoking status: Never Smoker    Smokeless tobacco: Never Used   Vaping Use    Vaping Use: Never used   Substance and Sexual Activity    Alcohol use: No    Drug use: No    Sexual activity: Yes     Partners: Male     Comment:     Other Topics Concern    Not on file   Social History Narrative    Not on file     Social Determinants of Health     Financial Resource Strain: Low Risk     Difficulty of Paying Living Expenses: Not hard at all   Food Insecurity: No Food Insecurity    Worried About Running Out of Food in the Last Year: Never true    Rashard of Food in the Last Year: Never true   Transportation Needs: No Transportation Needs    Lack of Transportation (Medical): No    Lack of Transportation (Non-Medical):  No   Physical Activity:     Days of Exercise per Week:     Minutes of Exercise per Session:    Stress:     Feeling of Stress :    Social Connections:     Frequency of Communication with Friends and Family:     Frequency of Social Gatherings with Friends and Family:     Attends Sikhism Services:     Active Member of Clubs or Organizations:     Attends Club or Organization Meetings:     Marital Status:    Intimate Partner Violence:     Fear of Current or Ex-Partner:     Emotionally Abused:     Physically Abused:     Sexually Abused: Allergies   Allergen Reactions    Lisinopril      Cough     Morphine Other (See Comments)     heart races    Tramadol Other (See Comments)     dizziness, lightheaded, nausea     Current Outpatient Medications on File Prior to Visit   Medication Sig Dispense Refill    aspirin 81 MG chewable tablet Take 81 mg by mouth daily      losartan (COZAAR) 50 MG tablet Take 1 tablet by mouth daily 30 tablet 0    ipratropium (ATROVENT) 0.06 % nasal spray 1 spray by Nasal route every 8 hours as needed for Rhinitis 1 Bottle 11    mometasone (ELOCON) 0.1 % cream Apply topically daily to rash on legs 45 g 0    fexofenadine (ALLEGRA) 180 MG tablet Take 1 tablet by mouth daily as needed (ALLERGIES) 90 tablet 3    baclofen (LIORESAL) 10 MG tablet TAKE 1 TABLET BY MOUTH TWICE DAILY AS NEEDED MUSCLE  PAIN  OR  SPASMS (Hold for sedation, no driving or operating machinery while using these) 60 tablet 2    metoprolol succinate (TOPROL XL) 50 MG extended release tablet Take 1 tablet by mouth daily 90 tablet 3    Blood Pressure KIT Dx: HTN 1 kit 0    zoster recombinant adjuvanted vaccine (SHINGRIX) 50 MCG/0.5ML SUSR injection Inject 0.5 mLs into the muscle See Admin Instructions 1 dose now and repeat in 2-6 months (Patient not taking: Reported on 6/14/2021) 0.5 mL 0     No current facility-administered medications on file prior to visit. I have personally reviewed the ROS, PMH, PFH, and social history     Review of Systems   Constitutional: Negative for chills and fever. HENT: Negative for congestion.     Eyes: Negative for pain. Respiratory: Negative for shortness of breath. Cardiovascular: Negative for chest pain. Gastrointestinal: Negative for abdominal pain. Genitourinary: Negative for hematuria. Musculoskeletal: Negative for back pain. Allergic/Immunologic: Negative for immunocompromised state. Neurological: Negative for headaches. Psychiatric/Behavioral: Negative for hallucinations. Objective:   BP (!) 147/94 (Site: Right Upper Arm, Position: Sitting, Cuff Size: Large Adult)   Pulse 65   Temp 98.1 °F (36.7 °C) (Tympanic)   Resp 15   Ht 5' 8\" (1.727 m)   Wt 191 lb (86.6 kg)   SpO2 97%   BMI 29.04 kg/m²     Physical Exam  Constitutional:       General: She is not in acute distress. Appearance: Normal appearance. She is not ill-appearing, toxic-appearing or diaphoretic. HENT:      Head: Normocephalic. Comments: Minimal swelling superior to right superior eyelid  No ciliary flush, hypopyon, hyphema, corneal opacity, or purulent discharge. No conjunctival injection. Neck:      Vascular: No carotid bruit. Cardiovascular:      Rate and Rhythm: Normal rate and regular rhythm. Pulses: Normal pulses. Heart sounds: Normal heart sounds. No murmur heard. No friction rub. No gallop. Pulmonary:      Effort: Pulmonary effort is normal. No respiratory distress. Breath sounds: Normal breath sounds. No wheezing, rhonchi or rales. Abdominal:      General: Abdomen is flat. There is no distension. Palpations: Abdomen is soft. Tenderness: There is no abdominal tenderness. There is no right CVA tenderness, left CVA tenderness, guarding or rebound. Musculoskeletal:      Cervical back: Neck supple. Right lower leg: No edema. Left lower leg: No edema. Skin:     General: Skin is warm. Findings: No erythema or rash. Neurological:      Mental Status: She is alert. Cranial Nerves: No cranial nerve deficit.    Psychiatric:         Mood and Affect: Mood normal.           Assessment:       Diagnosis Orders   1. Preseptal cellulitis of right eye     2. Vitamin D deficiency     3. Essential hypertension           Plan:     vc  Referred to cardiology (has appt)   Stress test neg for ischemia  There were 1429 PVCs and 66 PACs. (refer to cards)   Pending echo (not done)  Was referred to neuro and cards.  (from before)     Mod sleep apnea. (She will contact her supplier for help)  (from previous discussions)   emg not done yet, referred to ortho hand 05/2021  Increase losartan                            No orders of the defined types were placed in this encounter. No orders of the defined types were placed in this encounter. Take probiotics and yogurt and call asap if any diarrhea. Call immediately if not better or persistent symptoms. If not improving please go to ER for imaging, etc.   If you get cp, ha, vision changes, dyspnea, arm pain, etc go to ER. (Risk of uncontrolled HTN)   MA visit in 5 to 7 days for bp check. Call in 2 to 3 days if you don't hear from me after the bp check. If anything should change or worsen call ASAP, don't wait for next scheduled appointment. Return in about 2 days (around 7/16/2021) for Chronic condition management/appointment, worsening symptoms, call ASAP for appointment.       Adonay Powers MD

## 2021-07-28 ENCOUNTER — OFFICE VISIT (OUTPATIENT)
Dept: ORTHOPEDIC SURGERY | Age: 51
End: 2021-07-28
Payer: COMMERCIAL

## 2021-07-28 VITALS
HEART RATE: 71 BPM | OXYGEN SATURATION: 97 % | HEIGHT: 68 IN | BODY MASS INDEX: 28.95 KG/M2 | WEIGHT: 191 LBS | TEMPERATURE: 97.9 F

## 2021-07-28 DIAGNOSIS — G56.03 BILATERAL CARPAL TUNNEL SYNDROME: Primary | ICD-10-CM

## 2021-07-28 PROCEDURE — 99204 OFFICE O/P NEW MOD 45 MIN: CPT | Performed by: ORTHOPAEDIC SURGERY

## 2021-07-28 NOTE — PROGRESS NOTES
Subjective:      Patient ID: Renan Park is a 45 y.o. female who presents today for:  Chief Complaint   Patient presents with    Hand Pain     NP-bilateral hand pain, pt c/o right hand more, she has burning and tingling that goes all the way up both arms but mainly the right arm, symptoms are bad all day, pt is right hand dominant; no surg. hx on either hand; no EMG completed       HPI    The patient comes in with bilateral hand pain. The right is worse than left. She had a nerve test done a couple years ago per her own report she says it was severe left was worse than the right but her right bothers her more now she has been wearing splint she is tried anti-inflammatories without success. We are at this point time wait and for that film and therefore visit will be delayed until that is available to us to complete today's visit in its entirety. Independently reviewed the note on Julianna Tsai. Taken the note from Dr. Micaela Kearney notes from 2/26/2019    Julianna Tsai 1970     [unfilled] Ajay Caballero MD        REQUESTING PHYSICIAN: Dr. Yaneth Coyle: Bilateral upper extremity pain and paraesthesias.     FINDINGS: Electrodiagnostic testing was performed in the bilateral upper extremity. Tested nerves included median motor, median sensory, ulnar motor, ulnar sensory, and radial sensory.  Tested muscles included the cervical paraspinals, deltoids, biceps, triceps, pronator teres, brachioradialis, extensor indices proprius, first dorsal interossei, and opponens pollicis.     Needle exam revealed 1-2+ increased insertional activity in the bilateral Opponens Pollicis.      Motor unit action potential analysis revealed slightly decreased recruitment in the bilateral Opponens Pollicis.      Nerve conduction studies revealed decreased amblitude and prolonged distal latency in the bilateral Median Motor and Sensory nerves.      CLINICAL INTERPRETATION: This study is fever chills night sweats. Objective:   Pulse 71   Temp 97.9 °F (36.6 °C) (Temporal)   Ht 5' 8\" (1.727 m)   Wt 191 lb (86.6 kg)   SpO2 97%   BMI 29.04 kg/m²     ORTHOEXAM    Patient on physical examination has decreased sensation median distribution. She has instant positive Phalen's test she even has a little thenar atrophy bilaterally. Her ulnar sensation intrinsics are intact. She has good cap refill and color. No scars are noted about her hand. Assessment:       Diagnosis Orders   1. Bilateral carpal tunnel syndrome           Plan:   Discussed options with her. Due to her symptomatology and her nerve study she like to proceed with sequential carpal tunnel releases. She like to start with the right but that bothers her more than the left. She does have a  having prostate surgery in a week and therefore she might want to delay it more than the 18th. I will give her the option of suddenly with the 18th but if she wants to change her mind to let that be fine. I will have Linda Divers my  contact her later this week to get a definitive date. If 18 to early we can wait for the next operative day. No orders of the defined types were placed in this encounter. No orders of the defined types were placed in this encounter. No follow-ups on file.       Ray Dewey MD        Surgery Phone: [phone]   Surgery Fax: [phone]    Phone: [phone]          Fax: [phone]: Surgery Scheduling, PAT & PRE-OP Order Form  Call to advance Pyrites at [phone] at least 24 hours prior to date of service     Surgery Location: [facility]: [address], [address] [facility]  Tory Valerio MD Surgery Date:  but may switch  Time: tf   Patient's Name: Neha Pham : 1970    Gender: female   Home Phone:  [phone] Cell Phone: [phone] right carpal tunnel syndrome  SS#: [id number]  Emergency Contact:  Casey Hurtado   Phone: [phone]  Payor: Hakeem Lira /  /  /    ID No.: [id number]      PROVIDER TO COMPLETE:  Diagnosis: Carpal tunnel syndrome procedure/Consent: Carpal tunnel release  CPT Codes: 53974  Case Comments/Implants: N/A   Surgery Scheduled as:  Outpatient  Anesthesia Requested: Elvin Mcclellan  Referring Family Doctor: MD MARY Trevino  [x] [facility] PAT Date/Time:                                                            [x] History & Physical [] Physician will Provide [] Attached [] Dictated [] Other  [x] Follow Anesthesia Pre-Op Orders for X-rays, [facility]     [x] SN & PT to evaluate and treat/educate disease management, medications, home safety & equipment needs for total joint patients  [] Other: ____________________________________________________  Consults: Medical/Cardiac Clearance done by  ____________________  PRE-OP ORDERS:   Allergies: Lisinopril, Morphine, and Tramadol Latex Allergies:             Diabetic:           [] IV ________________________  [x] IV Start with J-loop     Preprinted Orders: Attached [] Yes [] No   ANTIBIOTIC PRE-OP: [x] ANCEF 2 gram IVPB if > 120 kg 3 grams IVPB within 1 hour of incision, if ALLERGIC, use VANCOMYCIN 1 gram IV, 2 hours pre-op  [] TXA Protocol [] Other:   [x] NPO   [] Betablocker (if needed) _____________________________________   [] Knee high anti-embolic hose [] Thigh high anti-embolic hose   Other: ______________________________________________________    Physician Signature Required:  Date/Time: 7/28/2021

## 2021-08-09 ENCOUNTER — OFFICE VISIT (OUTPATIENT)
Dept: CARDIOLOGY CLINIC | Age: 51
End: 2021-08-09
Payer: COMMERCIAL

## 2021-08-09 VITALS — HEART RATE: 76 BPM | OXYGEN SATURATION: 98 % | SYSTOLIC BLOOD PRESSURE: 110 MMHG | DIASTOLIC BLOOD PRESSURE: 70 MMHG

## 2021-08-09 DIAGNOSIS — R00.2 PALPITATIONS: Primary | ICD-10-CM

## 2021-08-09 PROCEDURE — 99213 OFFICE O/P EST LOW 20 MIN: CPT | Performed by: INTERNAL MEDICINE

## 2021-08-09 RX ORDER — METOPROLOL SUCCINATE 50 MG/1
50 TABLET, EXTENDED RELEASE ORAL DAILY
Qty: 90 TABLET | Refills: 3 | Status: SHIPPED | OUTPATIENT
Start: 2021-08-09 | End: 2022-05-26

## 2021-08-09 ASSESSMENT — ENCOUNTER SYMPTOMS
ABDOMINAL DISTENTION: 0
SHORTNESS OF BREATH: 1
FACIAL SWELLING: 0
VOICE CHANGE: 0
VOMITING: 0
BLOOD IN STOOL: 0
WHEEZING: 0
APNEA: 0
COLOR CHANGE: 0
CHEST TIGHTNESS: 0
TROUBLE SWALLOWING: 0
ANAL BLEEDING: 0
NAUSEA: 0

## 2021-08-09 NOTE — PROGRESS NOTES
[facility] CARDIOLOGY OFFICE FOLLOW-UP      Patient: Elba Nickerson  YOB: 1970  MRN: [id number]    Chief Complaint:  Chief Complaint   Patient presents with    Follow-up     9 MONTH    Palpitations    Shortness of Breath         Subjective/HPI:  8/9/21: Patient presents today for a follow-up of palpitation. I have not seen her in about 9 months. When she forgets to take the Toprol, she does get a bad spell of palpitations and possibly dizzy. But when she takes her pills, then there is no problem. I told her if she forgets to take it and does not remember, even if she took it, then take an extra 1 just to be safe to avoid these tachyarrhythmias. Stress test was negative there was mild hypertensive response. She is on excellent medication. Sees Dr. Mahad Sarah. No chest pain. Does not want to get the Covid Vaccine. See me in 6 months     11/23/2020: Patient presents today for evaluation of palpitation. Occasional chest pain. No headaches. Had an episode of profound dizziness but no syncope. Also complains of shortness of breath. Has history of hypertension. Dr. Mahad Sarah is consulting and has ordered a 48-hour Holter which is pending. Set up for Daniela Sargent and echo and see me. Shortness of breath occurs on exertion. No ankle edema.   She has history of chronic back pain and therefore Daniela Maxim is ordered instead of regular stress test       Past Medical History:   Diagnosis Date    Cancer ([address]     colon    Chronic back pain     GERD (gastroesophageal reflux disease)     Hypertension     Neck pain     Osteoarthritis     Sleep apnea        Past Surgical History:   Procedure Laterality Date    BREAST BIOPSY      COLONOSCOPY      HYSTERECTOMY, TOTAL ABDOMINAL         Family History   Problem Relation Age of Onset    High Blood Pressure Mother     High Cholesterol Mother     Cervical Cancer Mother         s/p cryosurgery    High Cholesterol Father     High Blood Pressure Father  Diabetes Father     Rectal Cancer Father     High Blood Pressure Maternal Aunt     High Cholesterol Maternal Aunt     Other Maternal Aunt     Diabetes Maternal Uncle     Prostate Cancer Maternal Uncle     Stomach Cancer Maternal Uncle     Other Maternal Grandmother     Prostate Cancer Maternal Grandfather     Stomach Cancer Paternal Aunt        Social History     Socioeconomic History    Marital status:      Spouse name: Not on file    Number of children: Not on file    Years of education: Not on file    Highest education level: Not on file   Occupational History    Not on file   Tobacco Use    Smoking status: Never Smoker    Smokeless tobacco: Never Used   Vaping Use    Vaping Use: Never used   Substance and Sexual Activity    Alcohol use: No    Drug use: No    Sexual activity: Yes     Partners: Male     Comment:     Other Topics Concern    Not on file   Social History Narrative    Not on file     Social Determinants of Health     Financial Resource Strain: Low Risk     Difficulty of Paying Living Expenses: Not hard at all   Food Insecurity: No Food Insecurity    Worried About [address] in the Last Year: Never true    [address] [facility] [address] [facility] in the Last Year: Never true   Transportation Needs: No Transportation Needs    Lack of Transportation (Medical): No    Lack of Transportation (Non-Medical): No   Physical Activity:     Days of Exercise per Week:     Minutes of Exercise per Session:    Stress:     Feeling of Stress :    Social Connections:     Frequency of Communication with Friends and Family:     Frequency of Social Gatherings with Friends and Family:     Attends Taoist Services:     Active Member of Clubs or Organizations:     Attends Club or Organization Meetings:     Marital Status:    Intimate Partner Violence:     Fear of Current or Ex-Partner:     Emotionally Abused:     Physically Abused:     Sexually Abused:         Allergies   Allergen Reactions    Lisinopril      Cough     Morphine Other (See Comments)     heart races    Tramadol Other (See Comments)     dizziness, lightheaded, nausea       Current Outpatient Medications   Medication Sig Dispense Refill    metoprolol succinate (TOPROL XL) 50 MG extended release tablet Take 1 tablet by mouth daily 90 tablet 3    vitamin D (ERGOCALCIFEROL) 1.25 MG (79470 UT) CAPS capsule Take 1 capsule by mouth once a week 8 capsule 0    losartan (COZAAR) 100 MG tablet Take 1 tablet by mouth daily 30 tablet 0    aspirin 81 MG chewable tablet Take 81 mg by mouth daily      ipratropium (ATROVENT) 0.06 % nasal spray 1 spray by Nasal route every 8 hours as needed for Rhinitis 1 Bottle 11    mometasone (ELOCON) 0.1 % cream Apply topically daily to rash on legs 45 g 0    fexofenadine (ALLEGRA) 180 MG tablet Take 1 tablet by mouth daily as needed (ALLERGIES) 90 tablet 3    Blood Pressure KIT Dx: HTN 1 kit 0     No current facility-administered medications for this visit. Review of Systems:   Review of Systems   Constitutional: Negative for activity change, appetite change, diaphoresis, fatigue and unexpected weight change. HENT: Negative for facial swelling, nosebleeds, trouble swallowing and voice change. Respiratory: Positive for shortness of breath. Negative for apnea, chest tightness and wheezing. Cardiovascular: Positive for palpitations. Negative for chest pain and leg swelling. Gastrointestinal: Negative for abdominal distention, anal bleeding, blood in stool, nausea and vomiting. Genitourinary: Negative for decreased urine volume and dysuria. Musculoskeletal: Negative for gait problem and myalgias. Skin: Negative. Negative for color change, pallor, rash and wound. Neurological: Negative for dizziness, syncope, facial asymmetry, weakness, light-headedness, numbness and headaches. Hematological: Does not bruise/bleed easily.    Psychiatric/Behavioral: Negative for agitation, behavioral problems, confusion, decreased concentration, hallucinations and suicidal ideas. The patient is not nervous/anxious and is not hyperactive. All other systems reviewed and are negative. Review of System is negative except for as mentioned above. Physical Examination:    /70 (Site: Left Upper Arm, Position: Sitting, Cuff Size: Large Adult)   Pulse 76   SpO2 98%    Physical Exam   Constitutional: She appears healthy. No distress. HENT:   Nose: Nose normal.   Mouth/Throat: Dentition is normal. Oropharynx is clear. Eyes: Pupils are equal, round, and reactive to light. Conjunctivae are normal.   Neck: Thyroid normal.   Cardiovascular: Regular rhythm, S1 normal, S2 normal, normal heart sounds, intact distal pulses and normal pulses. PMI is not displaced. No murmur heard. Pulmonary/Chest: She has no wheezes. She has no rales. She exhibits no tenderness. Abdominal: Soft. Bowel sounds are normal. She exhibits no distension and no mass. There is no splenomegaly or hepatomegaly. There is no abdominal tenderness. No hernia. Musculoskeletal:      Cervical back: Normal range of motion and neck supple. Neurological: She is alert and oriented to person, place, and time. She has normal motor skills. Gait normal.   Skin: Skin is warm and dry. No cyanosis. No jaundice. Nails show no clubbing. Patient Active Problem List   Diagnosis    Malignant tumor of colon ([address]    Essential hypertension    H/O left hemicolectomy    SEBASTIÁN (obstructive sleep apnea)    Renal calculi     (spontaneous vaginal delivery)    Cervical spondylosis without myelopathy    Bilateral carpal tunnel syndrome    Esophageal dysphagia    Polyp of descending colon    History of colon cancer           No orders of the defined types were placed in this encounter. No orders of the defined types were placed in this encounter. Assessment/Orders:       ICD-10-CM    1.  Palpitations  R00.2 No orders of the defined types were placed in this encounter. There are no discontinued medications. No orders of the defined types were placed in this encounter. Plan:  Patient is ok to take an extra Toprol, in case she forgot if she took it or not, to avoid palpitations. Stay on same medications.     See me in 65 months        Electronically signed by: Magalie Montes MD  8/9/2021 2:05 PM

## 2021-08-12 ENCOUNTER — OFFICE VISIT (OUTPATIENT)
Dept: ORTHOPEDIC SURGERY | Age: 51
End: 2021-08-12
Payer: COMMERCIAL

## 2021-08-12 VITALS
DIASTOLIC BLOOD PRESSURE: 89 MMHG | SYSTOLIC BLOOD PRESSURE: 177 MMHG | HEART RATE: 69 BPM | BODY MASS INDEX: 28.95 KG/M2 | OXYGEN SATURATION: 97 % | HEIGHT: 68 IN | TEMPERATURE: 97.2 F | WEIGHT: 191 LBS

## 2021-08-12 DIAGNOSIS — G56.03 BILATERAL CARPAL TUNNEL SYNDROME: Primary | ICD-10-CM

## 2021-08-12 DIAGNOSIS — Z01.818 PREOP EXAMINATION: ICD-10-CM

## 2021-08-12 DIAGNOSIS — I10 ESSENTIAL HYPERTENSION: Primary | ICD-10-CM

## 2021-08-12 PROCEDURE — 93010 ELECTROCARDIOGRAM REPORT: CPT | Performed by: PHYSICIAN ASSISTANT

## 2021-08-12 PROCEDURE — 93000 ELECTROCARDIOGRAM COMPLETE: CPT | Performed by: PHYSICIAN ASSISTANT

## 2021-08-12 PROCEDURE — 99203 OFFICE O/P NEW LOW 30 MIN: CPT | Performed by: PHYSICIAN ASSISTANT

## 2021-08-12 RX ORDER — LOSARTAN POTASSIUM 100 MG/1
100 TABLET ORAL DAILY
Qty: 30 TABLET | Refills: 0 | Status: SHIPPED | OUTPATIENT
Start: 2021-08-12 | End: 2021-09-25

## 2021-08-12 NOTE — PROGRESS NOTES
[place] [address] [facility]    H&P: Preadmission Testing     Patient: Jony Huerta  YOB: 1970  MRN: [id number]    Subjective:     Chief Complaint   Patient presents with    Pre-op Exam     surgery is 08/18/2021 with Dr Lora Arredondo. Right carpal tunnel       HPI: Jony Huerta is a 45 y.o. female w/ pertinent PMHx of dysphagia, history of colon cancer post hemicolectomy, hypertension, SEBASTIÁN is here for preop evaluation for carpal tunnel release that is scheduled for next Wednesday but was moved to tomorrow    She denies any significant heart disease however she does have hypertension. Systolics in the 751R today. She is on metoprolol for palpitations. She never had any SVT or A. fib. EKG was normal sinus. She has no lung disease however she does have sleep apnea. This is well controlled with her CPAP. She has no kidney disease. Is not diabetic. She is a smoker.   She takes aspirin daily for prophylactic reasons, she was instructed stop taking this until after surgery          Past Medical History:        Diagnosis Date    Cancer [address] [address]     colon    Chronic back pain     GERD (gastroesophageal reflux disease)     Hypertension     Neck pain     Osteoarthritis     Sleep apnea      Past Surgical History:    Past Surgical History:   Procedure Laterality Date    BREAST BIOPSY      COLONOSCOPY      HYSTERECTOMY, TOTAL ABDOMINAL         Medications Prior to Admission:    Current Outpatient Medications   Medication Sig Dispense Refill    losartan (COZAAR) 100 MG tablet Take 1 tablet by mouth daily 30 tablet 0    metoprolol succinate (TOPROL XL) 50 MG extended release tablet Take 1 tablet by mouth daily 90 tablet 3    vitamin D (ERGOCALCIFEROL) 1.25 MG (47766 UT) CAPS capsule Take 1 capsule by mouth once a week 8 capsule 0    aspirin 81 MG chewable tablet Take 81 mg by mouth daily      ipratropium (ATROVENT) 0.06 % nasal spray 1 spray by Nasal route every 8 hours as needed for Rhinitis 1 Bottle 11    fexofenadine (ALLEGRA) 180 MG tablet Take 1 tablet by mouth daily as needed (ALLERGIES) 90 tablet 3    Blood Pressure KIT Dx: HTN 1 kit 0     No current facility-administered medications for this visit. Allergies:    Lisinopril, Morphine, and Tramadol    Social History:   Social History     Socioeconomic History    Marital status:      Spouse name: Not on file    Number of children: Not on file    Years of education: Not on file    Highest education level: Not on file   Occupational History    Not on file   Tobacco Use    Smoking status: Never Smoker    Smokeless tobacco: Never Used   Vaping Use    Vaping Use: Never used   Substance and Sexual Activity    Alcohol use: No    Drug use: No    Sexual activity: Yes     Partners: Male     Comment:     Other Topics Concern    Not on file   Social History Narrative    Not on file     Social Determinants of Health     Financial Resource Strain: Low Risk     Difficulty of Paying Living Expenses: Not hard at all   Food Insecurity: No Food Insecurity    Worried About [address] in the Last Year: Never true    [address] [employer] in the Last Year: Never true   Transportation Needs: No Transportation Needs    Lack of Transportation (Medical): No    Lack of Transportation (Non-Medical):  No   Physical Activity:     Days of Exercise per Week:     Minutes of Exercise per Session:    Stress:     Feeling of Stress :    Social Connections:     Frequency of Communication with Friends and Family:     Frequency of Social Gatherings with Friends and Family:     Attends Baptist Services:     Active Member of Clubs or Organizations:     Attends Club or Organization Meetings:     Marital Status:    Intimate Partner Violence:     Fear of Current or Ex-Partner:     Emotionally Abused:     Physically Abused:     Sexually Abused:        Family History:       Problem Relation Age of Onset    High Blood Pressure Mother     High Cholesterol Mother     Cervical Cancer Mother         s/p cryosurgery    High Cholesterol Father     High Blood Pressure Father     Diabetes Father     Rectal Cancer Father     High Blood Pressure Maternal Aunt     High Cholesterol Maternal Aunt     Other Maternal Aunt     Diabetes Maternal Uncle     Prostate Cancer Maternal Uncle     Stomach Cancer Maternal Uncle     Other Maternal Grandmother     Prostate Cancer Maternal Grandfather     Stomach Cancer Paternal Aunt        Objective: There were no vitals taken for this visit. Physical Exam  Constitutional:       General: She is not in acute distress. Appearance: Normal appearance. She is not ill-appearing. HENT:      Head: Normocephalic. Nose: Nose normal. No congestion or rhinorrhea. Mouth/Throat:      Mouth: Mucous membranes are moist.      Pharynx: Oropharynx is clear. No oropharyngeal exudate or posterior oropharyngeal erythema. Eyes:      Extraocular Movements: Extraocular movements intact. Pupils: Pupils are equal, round, and reactive to light. Cardiovascular:      Rate and Rhythm: Normal rate and regular rhythm. Pulses: Normal pulses. Heart sounds: Normal heart sounds. Pulmonary:      Effort: Pulmonary effort is normal.      Breath sounds: Normal breath sounds. No wheezing, rhonchi or rales. Abdominal:      General: Abdomen is flat. Bowel sounds are normal.      Palpations: Abdomen is soft. Tenderness: There is no abdominal tenderness. Skin:     General: Skin is warm and dry. Capillary Refill: Capillary refill takes less than 2 seconds. Comments: No swelling or erythema over the incision site   Neurological:      General: No focal deficit present. Mental Status: She is alert and oriented to person, place, and time.             Radiographs and Laboratory Studies:   EKG:  NSR    Laboratory Studies:   Lab Results   Component Value Date    WBC 7.2 06/14/2021    HGB [x]? History & Physical []? Physician will Provide []? Attached []? Dictated []? Other  [x]? Follow Anesthesia Pre-Op Orders for X-rays, [facility]     [x]? SN & PT to evaluate and treat/educate disease management, medications, home safety & equipment needs for total joint patients  []? Other: ____________________________________________________  Consults: Medical/Cardiac Clearance done by  ____________________  PRE-OP ORDERS:   Allergies: Lisinopril, Morphine, and Tramadol      Latex Allergies:             Diabetic:           []? IV ________________________  [x]? IV Start with J-loop     Preprinted Orders: Attached []? Yes []? No   ANTIBIOTIC PRE-OP: [x]? ANCEF 2 gram IVPB if > 120 kg 3 grams IVPB within 1 hour of incision, if ALLERGIC, use VANCOMYCIN 1 gram IV, 2 hours pre-op  []? TXA Protocol []? Other:   [x]? NPO   []? Betablocker (if needed) _____________________________________   []? Knee high anti-embolic hose []?  Thigh high anti-embolic hose   Other: ______________________________________________________     Physician Signature Required:  Date/Time: 7/28/2021

## 2021-08-12 NOTE — PATIENT INSTRUCTIONS
-please walk over to our lab for your blood work, located right outside our office near the elevators    -if you are fully vaccinated, please bring your vaccination card with you the day of surgery  -if you are not fully vaccinated, you are required to have a COVID test prior to your surgery. please ensure that you have made arrangements to get this done.   -stop taking asprin and motrin until after your surgery.   -no eating / drinking the after midnight the night before surgery.

## 2021-08-12 NOTE — TELEPHONE ENCOUNTER
SENT  Needs bp check and bmp  Thanks,    Call immediately should something change.      Miguelina Richardson

## 2021-08-13 ENCOUNTER — HOSPITAL ENCOUNTER (OUTPATIENT)
Age: 51
Setting detail: OUTPATIENT SURGERY
Discharge: HOME | End: 2021-08-13
Attending: ORTHOPAEDIC SURGERY | Admitting: ORTHOPAEDIC SURGERY
Payer: COMMERCIAL

## 2021-08-13 ENCOUNTER — ANESTHESIA (OUTPATIENT)
Dept: OPERATING ROOM | Age: 51
End: 2021-08-13
Payer: COMMERCIAL

## 2021-08-13 ENCOUNTER — ANESTHESIA EVENT (OUTPATIENT)
Dept: OPERATING ROOM | Age: 51
End: 2021-08-13
Payer: COMMERCIAL

## 2021-08-13 VITALS
SYSTOLIC BLOOD PRESSURE: 147 MMHG | TEMPERATURE: 97.2 F | RESPIRATION RATE: 16 BRPM | WEIGHT: 192 LBS | OXYGEN SATURATION: 99 % | HEIGHT: 68 IN | DIASTOLIC BLOOD PRESSURE: 73 MMHG | HEART RATE: 71 BPM | BODY MASS INDEX: 29.1 KG/M2

## 2021-08-13 VITALS — OXYGEN SATURATION: 99 % | DIASTOLIC BLOOD PRESSURE: 57 MMHG | SYSTOLIC BLOOD PRESSURE: 129 MMHG

## 2021-08-13 DIAGNOSIS — G89.18 POST-OPERATIVE PAIN: Primary | ICD-10-CM

## 2021-08-13 PROCEDURE — 2580000003 HC RX 258: Performed by: ORTHOPAEDIC SURGERY

## 2021-08-13 PROCEDURE — 2500000003 HC RX 250 WO HCPCS: Performed by: NURSE ANESTHETIST, CERTIFIED REGISTERED

## 2021-08-13 PROCEDURE — 3700000001 HC ADD 15 MINUTES (ANESTHESIA): Performed by: ORTHOPAEDIC SURGERY

## 2021-08-13 PROCEDURE — 3600000003 HC SURGERY LEVEL 3 BASE: Performed by: ORTHOPAEDIC SURGERY

## 2021-08-13 PROCEDURE — 6360000002 HC RX W HCPCS: Performed by: NURSE ANESTHETIST, CERTIFIED REGISTERED

## 2021-08-13 PROCEDURE — 6360000002 HC RX W HCPCS: Performed by: ORTHOPAEDIC SURGERY

## 2021-08-13 PROCEDURE — 7100000000 HC PACU RECOVERY - FIRST 15 MIN: Performed by: ORTHOPAEDIC SURGERY

## 2021-08-13 PROCEDURE — 3600000013 HC SURGERY LEVEL 3 ADDTL 15MIN: Performed by: ORTHOPAEDIC SURGERY

## 2021-08-13 PROCEDURE — 3700000000 HC ANESTHESIA ATTENDED CARE: Performed by: ORTHOPAEDIC SURGERY

## 2021-08-13 PROCEDURE — 7100000011 HC PHASE II RECOVERY - ADDTL 15 MIN: Performed by: ORTHOPAEDIC SURGERY

## 2021-08-13 PROCEDURE — 2709999900 HC NON-CHARGEABLE SUPPLY: Performed by: ORTHOPAEDIC SURGERY

## 2021-08-13 PROCEDURE — 2580000003 HC RX 258: Performed by: ANESTHESIOLOGY

## 2021-08-13 PROCEDURE — 64721 CARPAL TUNNEL SURGERY: CPT | Performed by: ORTHOPAEDIC SURGERY

## 2021-08-13 PROCEDURE — 2500000003 HC RX 250 WO HCPCS: Performed by: ORTHOPAEDIC SURGERY

## 2021-08-13 PROCEDURE — 7100000010 HC PHASE II RECOVERY - FIRST 15 MIN: Performed by: ORTHOPAEDIC SURGERY

## 2021-08-13 PROCEDURE — 7100000001 HC PACU RECOVERY - ADDTL 15 MIN: Performed by: ORTHOPAEDIC SURGERY

## 2021-08-13 RX ORDER — MORPHINE SULFATE 2 MG/ML
2 INJECTION, SOLUTION INTRAMUSCULAR; INTRAVENOUS
Status: DISCONTINUED | OUTPATIENT
Start: 2021-08-13 | End: 2021-08-13 | Stop reason: HOSPADM

## 2021-08-13 RX ORDER — BUPIVACAINE HYDROCHLORIDE 5 MG/ML
INJECTION, SOLUTION EPIDURAL; INTRACAUDAL PRN
Status: DISCONTINUED | OUTPATIENT
Start: 2021-08-13 | End: 2021-08-13 | Stop reason: ALTCHOICE

## 2021-08-13 RX ORDER — OXYCODONE HYDROCHLORIDE 5 MG/1
5 TABLET ORAL EVERY 4 HOURS PRN
Status: DISCONTINUED | OUTPATIENT
Start: 2021-08-13 | End: 2021-08-13 | Stop reason: HOSPADM

## 2021-08-13 RX ORDER — 0.9 % SODIUM CHLORIDE 0.9 %
500 INTRAVENOUS SOLUTION INTRAVENOUS
Status: DISCONTINUED | OUTPATIENT
Start: 2021-08-13 | End: 2021-08-13 | Stop reason: HOSPADM

## 2021-08-13 RX ORDER — METOCLOPRAMIDE HYDROCHLORIDE 5 MG/ML
10 INJECTION INTRAMUSCULAR; INTRAVENOUS
Status: DISCONTINUED | OUTPATIENT
Start: 2021-08-13 | End: 2021-08-13 | Stop reason: HOSPADM

## 2021-08-13 RX ORDER — FENTANYL CITRATE 50 UG/ML
25 INJECTION, SOLUTION INTRAMUSCULAR; INTRAVENOUS EVERY 5 MIN PRN
Status: DISCONTINUED | OUTPATIENT
Start: 2021-08-13 | End: 2021-08-13 | Stop reason: HOSPADM

## 2021-08-13 RX ORDER — SODIUM CHLORIDE 9 MG/ML
50 INJECTION, SOLUTION INTRAVENOUS CONTINUOUS
Status: DISCONTINUED | OUTPATIENT
Start: 2021-08-13 | End: 2021-08-13 | Stop reason: HOSPADM

## 2021-08-13 RX ORDER — LIDOCAINE HYDROCHLORIDE 5 MG/ML
INJECTION, SOLUTION INFILTRATION; INTRAVENOUS PRN
Status: DISCONTINUED | OUTPATIENT
Start: 2021-08-13 | End: 2021-08-13 | Stop reason: SDUPTHER

## 2021-08-13 RX ORDER — MIDAZOLAM HYDROCHLORIDE 1 MG/ML
INJECTION INTRAMUSCULAR; INTRAVENOUS PRN
Status: DISCONTINUED | OUTPATIENT
Start: 2021-08-13 | End: 2021-08-13 | Stop reason: SDUPTHER

## 2021-08-13 RX ORDER — LABETALOL HYDROCHLORIDE 5 MG/ML
5 INJECTION, SOLUTION INTRAVENOUS EVERY 10 MIN PRN
Status: DISCONTINUED | OUTPATIENT
Start: 2021-08-13 | End: 2021-08-13 | Stop reason: HOSPADM

## 2021-08-13 RX ORDER — SODIUM CHLORIDE 9 MG/ML
25 INJECTION, SOLUTION INTRAVENOUS PRN
Status: DISCONTINUED | OUTPATIENT
Start: 2021-08-13 | End: 2021-08-13 | Stop reason: HOSPADM

## 2021-08-13 RX ORDER — MORPHINE SULFATE 4 MG/ML
4 INJECTION, SOLUTION INTRAMUSCULAR; INTRAVENOUS
Status: DISCONTINUED | OUTPATIENT
Start: 2021-08-13 | End: 2021-08-13 | Stop reason: HOSPADM

## 2021-08-13 RX ORDER — SODIUM CHLORIDE 0.9 % (FLUSH) 0.9 %
10 SYRINGE (ML) INJECTION PRN
Status: DISCONTINUED | OUTPATIENT
Start: 2021-08-13 | End: 2021-08-13 | Stop reason: HOSPADM

## 2021-08-13 RX ORDER — HYDROCODONE BITARTRATE AND ACETAMINOPHEN 5; 325 MG/1; MG/1
1 TABLET ORAL EVERY 4 HOURS PRN
Qty: 12 TABLET | Refills: 0 | Status: SHIPPED | OUTPATIENT
Start: 2021-08-13 | End: 2021-08-16

## 2021-08-13 RX ORDER — SODIUM CHLORIDE, SODIUM LACTATE, POTASSIUM CHLORIDE, CALCIUM CHLORIDE 600; 310; 30; 20 MG/100ML; MG/100ML; MG/100ML; MG/100ML
INJECTION, SOLUTION INTRAVENOUS CONTINUOUS
Status: DISCONTINUED | OUTPATIENT
Start: 2021-08-13 | End: 2021-08-13 | Stop reason: HOSPADM

## 2021-08-13 RX ORDER — DIPHENHYDRAMINE HYDROCHLORIDE 50 MG/ML
12.5 INJECTION INTRAMUSCULAR; INTRAVENOUS
Status: DISCONTINUED | OUTPATIENT
Start: 2021-08-13 | End: 2021-08-13 | Stop reason: HOSPADM

## 2021-08-13 RX ORDER — KETOROLAC TROMETHAMINE 30 MG/ML
INJECTION, SOLUTION INTRAMUSCULAR; INTRAVENOUS PRN
Status: DISCONTINUED | OUTPATIENT
Start: 2021-08-13 | End: 2021-08-13 | Stop reason: SDUPTHER

## 2021-08-13 RX ORDER — ONDANSETRON 2 MG/ML
4 INJECTION INTRAMUSCULAR; INTRAVENOUS
Status: DISCONTINUED | OUTPATIENT
Start: 2021-08-13 | End: 2021-08-13 | Stop reason: HOSPADM

## 2021-08-13 RX ORDER — SODIUM CHLORIDE 0.9 % (FLUSH) 0.9 %
10 SYRINGE (ML) INJECTION EVERY 12 HOURS SCHEDULED
Status: DISCONTINUED | OUTPATIENT
Start: 2021-08-13 | End: 2021-08-13 | Stop reason: HOSPADM

## 2021-08-13 RX ORDER — MAGNESIUM HYDROXIDE 1200 MG/15ML
LIQUID ORAL CONTINUOUS PRN
Status: COMPLETED | OUTPATIENT
Start: 2021-08-13 | End: 2021-08-13

## 2021-08-13 RX ORDER — PROPOFOL 10 MG/ML
INJECTION, EMULSION INTRAVENOUS CONTINUOUS PRN
Status: DISCONTINUED | OUTPATIENT
Start: 2021-08-13 | End: 2021-08-13 | Stop reason: SDUPTHER

## 2021-08-13 RX ADMIN — LIDOCAINE HYDROCHLORIDE 40 ML: 5 INJECTION, SOLUTION INFILTRATION; INTRAVENOUS at 13:18

## 2021-08-13 RX ADMIN — CEFAZOLIN 2 G: 10 INJECTION, POWDER, FOR SOLUTION INTRAVENOUS at 13:12

## 2021-08-13 RX ADMIN — KETOROLAC TROMETHAMINE 30 MG: 30 INJECTION, SOLUTION INTRAMUSCULAR; INTRAVENOUS at 13:43

## 2021-08-13 RX ADMIN — PROPOFOL 140 MCG/KG/MIN: 10 INJECTION, EMULSION INTRAVENOUS at 13:21

## 2021-08-13 RX ADMIN — SODIUM CHLORIDE, POTASSIUM CHLORIDE, SODIUM LACTATE AND CALCIUM CHLORIDE: 600; 310; 30; 20 INJECTION, SOLUTION INTRAVENOUS at 12:04

## 2021-08-13 RX ADMIN — MIDAZOLAM HYDROCHLORIDE 2 MG: 2 INJECTION, SOLUTION INTRAMUSCULAR; INTRAVENOUS at 13:12

## 2021-08-13 ASSESSMENT — PULMONARY FUNCTION TESTS
PIF_VALUE: 1
PIF_VALUE: 0
PIF_VALUE: 1
PIF_VALUE: 0
PIF_VALUE: 1

## 2021-08-13 NOTE — ANESTHESIA POSTPROCEDURE EVALUATION
Department of Anesthesiology  Postprocedure Note    Patient: Ashwini Amaya  MRN: [id number]  YOB: 1970  Date of evaluation: 8/13/2021  Time:  1:54 PM     Procedure Summary     Date: 08/13/21 Room / Location: [address]    Anesthesia Start: 3093 Anesthesia Stop:     Procedure: RIGHT CARPAL TUNNEL RELEASE (Right ) Diagnosis: (RIGHT CARPAL TUNNEL SYNDROME)    Surgeons: Floyd Golden MD Responsible Provider: Vinny Jones MD    Anesthesia Type: MAC, Blayne block ASA Status: 2          Anesthesia Type: No value filed. Lionel Phase I:      Lionel Phase II:      Last vitals: Reviewed and per EMR flowsheets.        Anesthesia Post Evaluation    Patient location during evaluation: PACU  Patient participation: complete - patient participated  Level of consciousness: awake and alert  Pain score: 0  Airway patency: patent  Nausea & Vomiting: no nausea  Complications: no  Cardiovascular status: hemodynamically stable  Respiratory status: acceptable  Hydration status: stable

## 2021-08-13 NOTE — ANESTHESIA PRE PROCEDURE
Department of Anesthesiology  Preprocedure Note       Name:  Hilario Blandon   Age:  45 y.o.  :  1970                                          MRN:  [id number]         Date:  2021      Surgeon: Ade Cruz):  Nelly Astorga MD    Procedure: Procedure(s):  RIGHT CARPAL TUNNEL RELEASE. (PAT WITH TIM )    Medications prior to admission:   Prior to Admission medications    Medication Sig Start Date End Date Taking? Authorizing Provider   losartan (COZAAR) 100 MG tablet Take 1 tablet by mouth daily 21  Yes Daily Clark MD   metoprolol succinate (TOPROL XL) 50 MG extended release tablet Take 1 tablet by mouth daily 21  Yes Emiliano Sabillon MD   aspirin 81 MG chewable tablet Take 81 mg by mouth daily   Yes Historical Provider, MD   fexofenadine (ALLEGRA) 180 MG tablet Take 1 tablet by mouth daily as needed (ALLERGIES) 21  Yes Daily Clark MD   vitamin D (ERGOCALCIFEROL) 1.25 MG (62310 UT) CAPS capsule Take 1 capsule by mouth once a week 21   Daily Clark MD   ipratropium (ATROVENT) 0.06 % nasal spray 1 spray by Nasal route every 8 hours as needed for Rhinitis 21   Daily Clark MD   Blood Pressure KIT Dx: HTN 7/10/20   Daily Clark MD       Current medications:    Current Facility-Administered Medications   Medication Dose Route Frequency Provider Last Rate Last Admin    ceFAZolin (ANCEF) 2000 mg in dextrose 5 % 100 mL IVPB  2,000 mg Intravenous On Call to 29 Braxton Khalil MD        lactated ringers infusion   Intravenous Continuous Lindy Paulson  mL/hr at 21 1204 New Bag at 21 1204       Allergies:     Allergies   Allergen Reactions    Lisinopril      Cough     Morphine Other (See Comments)     heart races    Tramadol Other (See Comments)     dizziness, lightheaded, nausea       Problem List:    Patient Active Problem List   Diagnosis Code    Malignant tumor of colon ([facility] [address] C18.9    Essential hypertension I10    H/O left  08/12/2021    K 3.7 08/12/2021     08/12/2021    CO2 24 08/12/2021    BUN 5 08/12/2021    CREATININE 0.44 08/12/2021    GFRAA >60.0 08/12/2021    LABGLOM >60.0 08/12/2021    GLUCOSE 112 08/12/2021    PROT 6.7 08/12/2021    CALCIUM 9.1 08/12/2021    BILITOT 0.7 08/12/2021    ALKPHOS 73 08/12/2021    AST 13 08/12/2021    ALT 12 08/12/2021       POC Tests: No results for input(s): POCGLU, POCNA, POCK, POCCL, POCBUN, POCHEMO, POCHCT in the last 72 hours. Coags: No results found for: PROTIME, INR, APTT    HCG (If Applicable):   Lab Results   Component Value Date    HCGQUANT <2 09/23/2011        ABGs: No results found for: PHART, PO2ART, JMY2EBB, XEL5AYO, BEART, Y0UXYGEQ     Type & Screen (If Applicable):  No results found for: LABABO, LABRH    Drug/Infectious Status (If Applicable):  No results found for: HIV, HEPCAB    COVID-19 Screening (If Applicable):   Lab Results   Component Value Date    COVID19 Not Detected 08/12/2021           Anesthesia Evaluation  Patient summary reviewed and Nursing notes reviewed no history of anesthetic complications:   Airway: Mallampati: II  TM distance: >3 FB   Neck ROM: full  Mouth opening: > = 3 FB Dental: normal exam         Pulmonary:Negative Pulmonary ROS and normal exam    (+) sleep apnea:                             Cardiovascular:Negative CV ROS  Exercise tolerance: good (>4 METS),   (+) hypertension:,       ECG reviewed        Stress test reviewed       Beta Blocker:  Not on Beta Blocker         Neuro/Psych:   Negative Neuro/Psych ROS              GI/Hepatic/Renal: Neg GI/Hepatic/Renal ROS  (+) GERD:,           Endo/Other: Negative Endo/Other ROS             Pt had PAT visit. Abdominal:             Vascular: negative vascular ROS. Other Findings:             Anesthesia Plan      MAC and Blayne block     ASA 2       Induction: intravenous. MIPS: Prophylactic antiemetics administered. Anesthetic plan and risks discussed with patient.       Plan discussed with CRNA.     Attending anesthesiologist reviewed and agrees with Pre Eval content              Brittney Cee MD   8/13/2021

## 2021-08-13 NOTE — PROGRESS NOTES
Right hand/wrist dressing clean and dry, fingers warm and mobile, patient states right hand and fingers feel \"numb and heavy\", denies pain a this time. Tolerating po fluids.

## 2021-08-13 NOTE — OP NOTE
Operative Note      Patient: Sharonda Wall  YOB: 1970  MRN: [id number]    Date of Procedure: 8/13/2021    Pre-Op Diagnosis: RIGHT CARPAL TUNNEL SYNDROME    Post-Op Diagnosis: Same       Procedure(s):  RIGHT CARPAL TUNNEL RELEASE    Surgeon(s):  Thao Mcelroy MD    Assistant:   * No surgical staff found *    Anesthesia: [place] Block    Estimated Blood Loss (mL): Minimal    Complications: None    Specimens:   * No specimens in log *    Implants:  * No implants in log *      Drains: * No LDAs found *    Findings: Compressed nerve    Detailed Description of Procedure:     Brief clinical note:    Patient presents for a right carpal tunnel release today. The risks and benefits that procedure were discussed with the patient in detail. These include, but not limited to injury soft tissue nerves and vessels, incisional problems, pillar pain, residual deficits, and recurrence. Medical and anesthetic risks were also discussed. Patient is consented and the right carpal tunnel is marked preoperatively. Operative note:    Patient is taken the op room after anesthesia was administered in form of a [place] block the area is prepped and draped in usual manner. A final timeout is done with the operative team.  An incision is carried out with a 15 blade scalpel in line with the ring finger in the palm. Meticulous dissection is then carried out with a tenotomy incising the palmar fascia and a Heiss retractor is applied. The transverse carpal ligament is identified under direct visualization and incised with a 15 blade scalpel. Dissection is then carried out distally to the sentinel fat and proximally across the wrist with a tenotomy. This fully decompresses the contents of the carpal tunnel including the median nerve. A tenotomy was utilized to ensure no adhesions are present around the median nerve. Once this is confirmed this completes the release and the nerve is noted to be in continuity.   Wound is irrigated and closed with several nylon sutures. Marcaine without epinephrine is infiltrated in the area for postoperative pain relief. Dressings include Xeroform fluffs web roll and a volar splint. The patient tolerated procedure well without any intraoperative complications.     Electronically signed by Terrell Holt MD on 8/13/2021 at 2:21 PM

## 2021-08-26 ENCOUNTER — OFFICE VISIT (OUTPATIENT)
Dept: ORTHOPEDIC SURGERY | Age: 51
End: 2021-08-26

## 2021-08-26 VITALS
HEIGHT: 68 IN | TEMPERATURE: 97.8 F | WEIGHT: 192 LBS | OXYGEN SATURATION: 96 % | BODY MASS INDEX: 29.1 KG/M2 | RESPIRATION RATE: 16 BRPM | HEART RATE: 69 BPM

## 2021-08-26 DIAGNOSIS — G56.03 BILATERAL CARPAL TUNNEL SYNDROME: Primary | ICD-10-CM

## 2021-08-26 PROCEDURE — 99024 POSTOP FOLLOW-UP VISIT: CPT | Performed by: PHYSICIAN ASSISTANT

## 2021-08-26 NOTE — PROGRESS NOTES
[facility]      Subjective:      Chief Complaint   Patient presents with    Post-Op Check     S/p RIGHT CARPAL TUNNEL RELEASE - Right with Misael Oliva MD on 8/13/2021, Pt denies any pain today. HPI: Obinna Wilkes is a 45 y.o. female who is here 2 weeks postop CTR   She has intermediate relief of her symptoms since surgery. incisions are great.  sutures were removed. Past Medical History:   Diagnosis Date    Cancer ([address]     colon    Chronic back pain     GERD (gastroesophageal reflux disease)     Hypertension     Neck pain     Osteoarthritis     Sleep apnea       Past Surgical History:   Procedure Laterality Date    BREAST BIOPSY      CARPAL TUNNEL RELEASE Right 8/13/2021    RIGHT CARPAL TUNNEL RELEASE [facility] [address], TOTAL ABDOMINAL       Social History     Socioeconomic History    Marital status:      Spouse name: Not on file    Number of children: Not on file    Years of education: Not on file    Highest education level: Not on file   Occupational History    Not on file   Tobacco Use    Smoking status: Never Smoker    Smokeless tobacco: Never Used   Vaping Use    Vaping Use: Never used   Substance and Sexual Activity    Alcohol use: No    Drug use: No    Sexual activity: Yes     Partners: Male     Comment:     Other Topics Concern    Not on file   Social History Narrative    Not on file     Social Determinants of Health     Financial Resource Strain: Low Risk     Difficulty of Paying Living Expenses: Not hard at all   Food Insecurity: No Food Insecurity    Worried About [address] in the Last Year: Never true    [address] in the Last Year: Never true   Transportation Needs: No Transportation Needs    Lack of Transportation (Medical): No    Lack of Transportation (Non-Medical):  No   Physical Activity:     Days of Exercise per Pressure KIT Dx: HTN 1 kit 0     No current facility-administered medications on file prior to visit. Objective:   Pulse 69   Temp 97.8 °F (36.6 °C) (Temporal)   Resp 16   Ht 5' 8\" (1.727 m)   Wt 192 lb (87.1 kg)   SpO2 96%   BMI 29.19 kg/m²     General: WDWN, well appearing, in no distress   Skin: without breakdown, rash, normal in color    Well-healing incisions at the palmar aspect of the hand, no erythema or discharge or signs of infection      Radiographs and Laboratory Studies:       Laboratory Studies:   Lab Results   Component Value Date    WBC 9.2 08/12/2021    HGB 13.1 08/12/2021    HCT 38.5 08/12/2021    MCV 87.9 08/12/2021     08/12/2021     No results found for: SEDRATE  No results found for: CRP    Assessment and Plan:      Diagnosis Orders   1. Bilateral carpal tunnel syndrome         Sutures were removed. Incision looks great, no signs of infection. Instructed to continue with range of motion exercises until any residual stiffness is resolved. No bathing for another week, however they can shower. If they are having any issues or signs of infection, they were instructed to call our office immediately and make a follow-up appointment. The above plan was discussed in thorough detail with Dr. Enrrique Moore and the patient. No orders of the defined types were placed in this encounter. No orders of the defined types were placed in this encounter. No follow-ups on file.     Julieth Fernandez PA-C  [facility]  [phone]

## 2021-09-16 ENCOUNTER — OFFICE VISIT (OUTPATIENT)
Dept: OBGYN CLINIC | Age: 51
End: 2021-09-16
Payer: COMMERCIAL

## 2021-09-16 VITALS
SYSTOLIC BLOOD PRESSURE: 138 MMHG | WEIGHT: 188 LBS | HEIGHT: 68 IN | BODY MASS INDEX: 28.49 KG/M2 | DIASTOLIC BLOOD PRESSURE: 84 MMHG

## 2021-09-16 DIAGNOSIS — L72.0 EPIDERMAL CYST: ICD-10-CM

## 2021-09-16 DIAGNOSIS — Z11.51 SCREENING FOR HUMAN PAPILLOMAVIRUS: ICD-10-CM

## 2021-09-16 DIAGNOSIS — Z12.31 SCREENING MAMMOGRAM, ENCOUNTER FOR: ICD-10-CM

## 2021-09-16 DIAGNOSIS — Z01.419 WOMEN'S ANNUAL ROUTINE GYNECOLOGICAL EXAMINATION: Primary | ICD-10-CM

## 2021-09-16 PROCEDURE — 99212 OFFICE O/P EST SF 10 MIN: CPT | Performed by: OBSTETRICS & GYNECOLOGY

## 2021-09-16 PROCEDURE — 99396 PREV VISIT EST AGE 40-64: CPT | Performed by: OBSTETRICS & GYNECOLOGY

## 2021-09-16 ASSESSMENT — ENCOUNTER SYMPTOMS
ANAL BLEEDING: 0
RECTAL PAIN: 0
BLOOD IN STOOL: 0
DIARRHEA: 0
EYES NEGATIVE: 1
ABDOMINAL DISTENTION: 0
ABDOMINAL PAIN: 0
RESPIRATORY NEGATIVE: 1
NAUSEA: 0
CONSTIPATION: 0
VOMITING: 0
ALLERGIC/IMMUNOLOGIC NEGATIVE: 1

## 2021-09-16 NOTE — PROGRESS NOTES
Subjective:      Patient ID: Abbie Ackerman is a 45 y.o. female    Annual exam.   S/P SACHA. Pap performed and screening mammogram ordered. Monthly SBE encouraged. Screening colonoscopy recommended per routine. F/U annual exam or prn. Pt also wished to discuss small \" pimples \" on labia extending her appointment time by 10 minutes. Exam with small epidermal/inclusion cysts. Patient is asx. Discussed and explained not an uncommon finding with age. Regular warm soap and water for cleansing and if become symptomatic, instructed to call office. Also had questions regarding menopause after hysterectomy. Discussed menopausal sx and ovarian failure. Discussed natural tx of sx unless sx are debilitating, then sx can be addresses with other Rx options. All questions answered.           Vitals:  /84   Ht 5' 8\" (1.727 m)   Wt 188 lb (85.3 kg)   BMI 28.59 kg/m²   Past Medical History:   Diagnosis Date    Allergies     [address] [address]     colon    Chronic back pain     Fibroid     GERD (gastroesophageal reflux disease)     HPV in female     Hypertension     Neck pain     Osteoarthritis     Sleep apnea     Tachycardia      Past Surgical History:   Procedure Laterality Date    BREAST BIOPSY Right     CARPAL TUNNEL RELEASE Right 8/13/2021    RIGHT CARPAL TUNNEL RELEASE performed by Loree Garg MD at [address], TOTAL ABDOMINAL       Allergies:  Lisinopril, Morphine, and Tramadol  Current Outpatient Medications   Medication Sig Dispense Refill    losartan (COZAAR) 100 MG tablet Take 1 tablet by mouth daily 30 tablet 0    metoprolol succinate (TOPROL XL) 50 MG extended release tablet Take 1 tablet by mouth daily 90 tablet 3    vitamin D (ERGOCALCIFEROL) 1.25 MG (34928 UT) CAPS capsule Take 1 capsule by mouth once a week 8 capsule 0    ipratropium (ATROVENT) 0.06 % nasal spray 1 spray by Nasal route every 8 hours as needed for Rhinitis 1 Bottle 11    fexofenadine (ALLEGRA) 180 MG tablet Take 1 tablet by mouth daily as needed (ALLERGIES) 90 tablet 3    Blood Pressure KIT Dx: HTN 1 kit 0    aspirin 81 MG chewable tablet Take 81 mg by mouth daily (Patient not taking: Reported on 9/16/2021)       No current facility-administered medications for this visit. Social History     Socioeconomic History    Marital status:      Spouse name: Not on file    Number of children: Not on file    Years of education: Not on file    Highest education level: Not on file   Occupational History    Not on file   Tobacco Use    Smoking status: Never Smoker    Smokeless tobacco: Never Used   Vaping Use    Vaping Use: Never used   Substance and Sexual Activity    Alcohol use: No    Drug use: No    Sexual activity: Yes     Partners: Male     Comment:     Other Topics Concern    Not on file   Social History Narrative    Not on file     Social Determinants of Health     Financial Resource Strain: Low Risk     Difficulty of Paying Living Expenses: Not hard at all   Food Insecurity: No Food Insecurity    Worried About [address] [place] [address] [employer] in the Last Year: Never true    Rashard of Food in the Last Year: Never true   Transportation Needs: No Transportation Needs    Lack of Transportation (Medical): No    Lack of Transportation (Non-Medical):  No   Physical Activity:     Days of Exercise per Week:     Minutes of Exercise per Session:    Stress:     Feeling of Stress :    Social Connections:     Frequency of Communication with Friends and Family:     Frequency of Social Gatherings with Friends and Family:     Attends Temple Services:     Active Member of Clubs or Organizations:     Attends Club or Organization Meetings:     Marital Status:    Intimate Partner Violence:     Fear of Current or Ex-Partner:     Emotionally Abused:     Physically Abused:     Sexually Abused:      Family History   Problem Relation Age of Onset    High Blood Pressure Mother     High Cholesterol Mother     Cervical Cancer Mother         s/p cryosurgery    High Cholesterol Father     High Blood Pressure Father     Diabetes Father     Rectal Cancer Father     High Blood Pressure Maternal Aunt     High Cholesterol Maternal Aunt     Other Maternal Aunt     Diabetes Maternal Uncle     Prostate Cancer Maternal Uncle     Stomach Cancer Maternal Uncle     Other Maternal Grandmother     Prostate Cancer Maternal Grandfather     Stomach Cancer Paternal Aunt        Review of Systems   Constitutional: Negative. Negative for activity change, appetite change, chills, diaphoresis, fatigue, fever and unexpected weight change. HENT: Negative. Eyes: Negative. Respiratory: Negative. Cardiovascular: Negative. Gastrointestinal: Negative for abdominal distention, abdominal pain, anal bleeding, blood in stool, constipation, diarrhea, nausea, rectal pain and vomiting. Endocrine: Negative. Genitourinary: Negative for decreased urine volume, difficulty urinating, dyspareunia, dysuria, enuresis, flank pain, frequency, genital sores, hematuria, menstrual problem, pelvic pain, urgency, vaginal bleeding, vaginal discharge and vaginal pain. Musculoskeletal: Negative. Skin: Negative. Allergic/Immunologic: Negative. Neurological: Negative. Hematological: Negative. Psychiatric/Behavioral: Negative. Objective:     Physical Exam  Constitutional:       Appearance: She is well-developed. HENT:      Head: Normocephalic. Neck:      Thyroid: No thyromegaly. Cardiovascular:      Rate and Rhythm: Normal rate and regular rhythm. Heart sounds: Normal heart sounds. Pulmonary:      Effort: Pulmonary effort is normal. No respiratory distress. Breath sounds: Normal breath sounds. No wheezing or rales. Chest:      Chest wall: No tenderness.       Breasts:         Right: Normal. No swelling, bleeding, inverted nipple, mass, nipple discharge, skin change or tenderness. Left: Normal. No swelling, bleeding, inverted nipple, mass, nipple discharge, skin change or tenderness. Abdominal:      General: There is no distension. Palpations: Abdomen is soft. There is no mass. Tenderness: There is no abdominal tenderness. There is no guarding or rebound. Hernia: There is no hernia in the umbilical area, ventral area, left inguinal area or right inguinal area. Genitourinary:     Labia:         Right: No rash, tenderness, lesion or injury. Left: No rash, tenderness, lesion or injury. Urethra: No prolapse, urethral pain, urethral swelling or urethral lesion. Vagina: Normal. No signs of injury and foreign body. No vaginal discharge, erythema, tenderness or bleeding. Adnexa: Right adnexa normal and left adnexa normal.        Right: No mass, tenderness or fullness. Left: No mass, tenderness or fullness. Rectum: Normal.          Comments: S/P SACHA    Musculoskeletal:         General: No tenderness. Normal range of motion. Cervical back: Normal range of motion and neck supple. Lymphadenopathy:      Cervical: No cervical adenopathy. Upper Body:      Right upper body: No supraclavicular or axillary adenopathy. Left upper body: No supraclavicular or axillary adenopathy. Skin:     General: Skin is warm and dry. Coloration: Skin is not pale. Findings: No erythema or rash. Neurological:      Mental Status: She is alert and oriented to person, place, and time. Psychiatric:         Behavior: Behavior normal.         Thought Content: Thought content normal.         Judgment: Judgment normal.         Assessment:       Diagnosis Orders   1. Women's annual routine gynecological examination  PAP SMEAR   2. Screening for human papillomavirus  PAP SMEAR   3.  Screening mammogram, encounter for  PIEDAD DIGITAL SCREEN W OR WO CAD BILATERAL        Plan:      Medications placedthis encounter:  No orders of the defined types were placed in this encounter. Orders placedthis encounter:  Orders Placed This Encounter   Procedures    PIEDAD DIGITAL SCREEN W OR WO CAD BILATERAL     Standing Status:   Future     Standing Expiration Date:   9/16/2022    PAP SMEAR     Standing Status:   Future     Standing Expiration Date:   9/16/2022     Order Specific Question:   Collection Type     Answer: Thin Prep     Order Specific Question:   Prior Abnormal Pap Test     Answer:   No     Order Specific Question:   Screening or Diagnostic     Answer:   Screening     Order Specific Question:   HPV Requested? Answer:   Yes     Order Specific Question:   High Risk Patient     Answer:   N/A         Follow up:  Return in about 1 year (around 9/16/2022) for Annual.   Repeat Annual GYN exam every 1 year. Cervical Cytology exam starts age 24. If Negative Cytology;  follow-up screening per current guidelines. Mammograms yearly starting at age 36. Calcium and Vitamin D dosing reviewed ( age appropriate ). Colonoscopy and bone density screening discussed ( age appropriate ). Birth control and STD prevention discussed ( age appropriate ). Gardisil counseling completed for all patients ( age appropriate ). Routine health maintenance ( per PCP and guidelines ). The patient was asked if she would like a chaperone present for her intimate exam. She  Declined the chaperone.  Alexis Diop MD

## 2021-09-22 DIAGNOSIS — Z11.51 SCREENING FOR HUMAN PAPILLOMAVIRUS: ICD-10-CM

## 2021-09-22 DIAGNOSIS — Z01.419 WOMEN'S ANNUAL ROUTINE GYNECOLOGICAL EXAMINATION: ICD-10-CM

## 2021-09-25 RX ORDER — LOSARTAN POTASSIUM 100 MG/1
TABLET ORAL
Qty: 90 TABLET | Refills: 3 | Status: SHIPPED | OUTPATIENT
Start: 2021-09-25 | End: 2022-07-14 | Stop reason: SDUPTHER

## 2021-10-04 ENCOUNTER — PATIENT MESSAGE (OUTPATIENT)
Dept: FAMILY MEDICINE CLINIC | Age: 51
End: 2021-10-04

## 2021-10-04 ENCOUNTER — TELEPHONE (OUTPATIENT)
Dept: FAMILY MEDICINE CLINIC | Age: 51
End: 2021-10-04

## 2021-10-04 DIAGNOSIS — R94.31 ABNORMAL HOLTER EXAM: Primary | ICD-10-CM

## 2021-10-05 NOTE — TELEPHONE ENCOUNTER
Dr Willam Munoz is aware  Make sure she keeps her fu with him  I belive she is admitted to [facility]  Relay message to her thank you  Thanks,    Call immediately should something change.      Deepthi Zendejas

## 2021-10-07 ENCOUNTER — OFFICE VISIT (OUTPATIENT)
Dept: FAMILY MEDICINE CLINIC | Age: 51
End: 2021-10-07
Payer: COMMERCIAL

## 2021-10-07 DIAGNOSIS — R09.81 CONGESTION OF NASAL SINUS: Primary | ICD-10-CM

## 2021-10-07 DIAGNOSIS — Z20.822 CLOSE EXPOSURE TO COVID-19 VIRUS: ICD-10-CM

## 2021-10-07 LAB
Lab: NORMAL
PERFORMING INSTRUMENT: NORMAL
QC PASS/FAIL: NORMAL
SARS-COV-2, POC: NORMAL

## 2021-10-07 PROCEDURE — 87426 SARSCOV CORONAVIRUS AG IA: CPT | Performed by: NURSE PRACTITIONER

## 2021-10-07 PROCEDURE — 99441 PR PHYS/QHP TELEPHONE EVALUATION 5-10 MIN: CPT | Performed by: NURSE PRACTITIONER

## 2021-10-07 NOTE — PROGRESS NOTES
change, fatigue and fever. HENT: Positive for congestion. Negative for ear discharge, ear pain, mouth sores, rhinorrhea, sinus pressure, sinus pain and sore throat. Eyes: Negative. Negative for discharge. Respiratory: Negative for cough, shortness of breath and wheezing. Gastrointestinal: Negative for diarrhea, nausea and vomiting. Musculoskeletal: Negative for arthralgias and neck pain. Skin: Negative. Negative for rash. Allergic/Immunologic: Positive for environmental allergies. Neurological: Negative for dizziness, weakness and headaches. Hematological: Negative for adenopathy. Psychiatric/Behavioral: Negative for agitation, confusion and hallucinations. Prior to Visit Medications    Medication Sig Taking?  Authorizing Provider   losartan (COZAAR) 100 MG tablet Take 1 tablet by mouth once daily Yes Xiao Dorsey MD   metoprolol succinate (TOPROL XL) 50 MG extended release tablet Take 1 tablet by mouth daily Yes Jean Pierre Montes De Oca MD   vitamin D (ERGOCALCIFEROL) 1.25 MG (05918 UT) CAPS capsule Take 1 capsule by mouth once a week Yes Xiao Dorsey MD   aspirin 81 MG chewable tablet Take 81 mg by mouth daily  Yes Historical Provider, MD   ipratropium (ATROVENT) 0.06 % nasal spray 1 spray by Nasal route every 8 hours as needed for Rhinitis Yes Xiao Dorsey MD   fexofenadine (ALLEGRA) 180 MG tablet Take 1 tablet by mouth daily as needed (ALLERGIES) Yes Xiao Dorsey MD   Blood Pressure KIT Dx: HTN Yes Xiao Dorsey MD       Past Medical History:   Diagnosis Date    Allergies     Cancer ([facility] [address]     colon    Chronic back pain     Fibroid     GERD (gastroesophageal reflux disease)     HPV in female     Hypertension     Neck pain     Osteoarthritis     Sleep apnea     Tachycardia      Past Surgical History:   Procedure Laterality Date    BREAST BIOPSY Right     CARPAL TUNNEL RELEASE Right 8/13/2021    RIGHT CARPAL TUNNEL RELEASE performed by Trell Craig MD at [facility]    COLECTOMY      COLONOSCOPY      HYSTERECTOMY, TOTAL ABDOMINAL       Social History     Socioeconomic History    Marital status:      Spouse name: Not on file    Number of children: Not on file    Years of education: Not on file    Highest education level: Not on file   Occupational History    Not on file   Tobacco Use    Smoking status: Never Smoker    Smokeless tobacco: Never Used   Vaping Use    Vaping Use: Never used   Substance and Sexual Activity    Alcohol use: No    Drug use: No    Sexual activity: Yes     Partners: Male     Comment:     Other Topics Concern    Not on file   Social History Narrative    Not on file     Social Determinants of Health     Financial Resource Strain: Low Risk     Difficulty of Paying Living Expenses: Not hard at all   Food Insecurity: No Food Insecurity    Worried About Running Out of Food in the Last Year: Never true    Rashard of Food in the Last Year: Never true   Transportation Needs: No Transportation Needs    Lack of Transportation (Medical): No    Lack of Transportation (Non-Medical):  No   Physical Activity:     Days of Exercise per Week:     Minutes of Exercise per Session:    Stress:     Feeling of Stress :    Social Connections:     Frequency of Communication with Friends and Family:     Frequency of Social Gatherings with Friends and Family:     Attends Islam Services:     Active Member of Clubs or Organizations:     Attends Club or Organization Meetings:     Marital Status:    Intimate Partner Violence:     Fear of Current or Ex-Partner:     Emotionally Abused:     Physically Abused:     Sexually Abused:      Family History   Problem Relation Age of Onset    High Blood Pressure Mother     High Cholesterol Mother     Cervical Cancer Mother         s/p cryosurgery    High Cholesterol Father     High Blood Pressure Father     Diabetes Father     Rectal Cancer Father     High Blood Pressure Maternal Aunt  High Cholesterol Maternal Aunt     Other Maternal Aunt     Diabetes Maternal Uncle     Prostate Cancer Maternal Uncle     Stomach Cancer Maternal Uncle     Other Maternal Grandmother     Prostate Cancer Maternal Grandfather     Stomach Cancer Paternal Aunt      Allergies   Allergen Reactions    Lisinopril      Cough     Morphine Other (See Comments)     heart races    Tramadol Other (See Comments)     dizziness, lightheaded, nausea       PMH, Surgical Hx, Family Hx, and Social Hx reviewed and updated. Health Maintenance reviewed. PHYSICAL EXAMINATION:  \"[x]\" Indicates a positive item  \"[]\" Indicates a negative item    Vital Signs: (As obtained by patient/caregiver or practitioner observation)    Blood pressure-  Heart rate-    Respiratory rate-    Temperature-  Pulse oximetry-     Constitutional: [] Appears well-developed and well-nourished [x] No apparent distress      [] Abnormal-   Mental status  [x] Alert and awake  [x] Oriented to person/place/time [x]Able to follow commands      Eyes:  EOM    []  Normal  [] Abnormal-  Sclera  []  Normal  [] Abnormal -         Discharge []  None visible  [] Abnormal -    HENT:   [] Normocephalic, atraumatic.   [] Abnormal   [] Mouth/Throat: Mucous membranes are moist.     External Ears [] Normal  [] Abnormal-     Neck: [x] No visualized mass     Pulmonary/Chest: [x] Respiratory effort normal.  [x] No heard signs of difficulty breathing or respiratory distress        [] Abnormal-      Musculoskeletal:   [] Normal gait with no signs of ataxia         [] Normal range of motion of neck        [] Abnormal-       Neurological:       [] No Facial Asymmetry (Cranial nerve 7 motor function) (limited exam to video visit)          [] No gaze palsy        [] Abnormal-         Skin:        [] No significant exanthematous lesions or discoloration noted on facial skin         [] Abnormal-            Psychiatric:       [x] Normal Affect [x] No Hallucinations        [] Abnormal-     Other pertinent observable physical exam findings-   Results for orders placed or performed in visit on 10/07/21   POCT COVID-19, Antigen   Result Value Ref Range    SARS-COV-2, POC Not-Detected Not Detected    Lot Number [id number]     QC Pass/Fail pass     Performing Instrument BD Veritor        ASSESSMENT/PLAN:    Rapid testing negative. Did explain that they should be quarantining due to close exposure and monitoring for symptoms for 14 days after exposure. Will place an order for them to retest 10/11 for better accuracy. Assessment & Plan   Rosemarie Cross was seen today for congestion. Diagnoses and all orders for this visit:    Congestion of nasal sinus  -     POCT COVID-19, Antigen    Close exposure to COVID-19 virus  -     POCT COVID-19, Antigen; Future      Orders Placed This Encounter   Procedures    POCT COVID-19, Antigen     Order Specific Question:   Is this test for diagnosis or screening? Answer:   Diagnosis of ill patient     Order Specific Question:   Symptomatic for COVID-19 as defined by CDC? Answer:   Yes     Order Specific Question:   Date of Symptom Onset     Answer:   10/6/2021     Order Specific Question:   Hospitalized for COVID-19? Answer:   No     Order Specific Question:   Admitted to ICU for COVID-19? Answer:   No     Order Specific Question:   Employed in healthcare setting? Answer:   Unknown     Order Specific Question:   Resident in a congregate (group) care setting? Answer:   No     Order Specific Question:   Pregnant? Answer:   No     Order Specific Question:   Previously tested for COVID-19? Answer: Yes    POCT COVID-19, Antigen     Standing Status:   Future     Standing Expiration Date:   10/7/2022     Order Specific Question:   Is this test for diagnosis or screening? Answer:   Diagnosis of ill patient     Order Specific Question:   Symptomatic for COVID-19 as defined by CDC?      Answer:   No     Order Specific Question:   Date of Symptom Onset     Answer:   N/A     Order Specific Question:   Hospitalized for COVID-19? Answer:   No     Order Specific Question:   Admitted to ICU for COVID-19? Answer:   No     Order Specific Question:   Employed in healthcare setting? Answer:   No     Order Specific Question:   Resident in a congregate (group) care setting? Answer:   No     Order Specific Question:   Pregnant? Answer:   No     Order Specific Question:   Previously tested for COVID-19? Answer:   Yes     No orders of the defined types were placed in this encounter. There are no discontinued medications. Return if symptoms worsen or fail to improve. Reviewed with the patient: current clinical status, medications, activities and diet. Side effects, adverse effects of the medication prescribed today, as well as treatment plan/ rationale and result expectations have been discussed with the patient who expresses understanding and desires to proceed. Close follow up to evaluate treatment results and for coordination of care. I have reviewed the patient's medical history in detail and updated the computerized patient record. Patient identification was verified at the start of the visit: Yes    Total time spent on this encounter: About 6 mins       --MAYTE Brunson CNP on 10/11/2021 at 11:18 AM    An electronic signature was used to authenticate this note.

## 2021-10-11 LAB
Lab: ABNORMAL
PERFORMING INSTRUMENT: ABNORMAL
QC PASS/FAIL: ABNORMAL
SARS-COV-2, POC: DETECTED

## 2021-10-11 PROCEDURE — 87426 SARSCOV CORONAVIRUS AG IA: CPT | Performed by: NURSE PRACTITIONER

## 2021-10-11 ASSESSMENT — ENCOUNTER SYMPTOMS
SINUS PRESSURE: 0
DIARRHEA: 0
SINUS PAIN: 0
SORE THROAT: 0
RHINORRHEA: 0
NAUSEA: 0
WHEEZING: 0
EYES NEGATIVE: 1
SHORTNESS OF BREATH: 0
EYE DISCHARGE: 0
VOMITING: 0
COUGH: 0

## 2021-10-11 NOTE — PATIENT INSTRUCTIONS
Patient Education        Nueve cosas que debe hacer si se ha expuesto al COVID-19  9 Things To Do If You've Been Exposed to COVID-19  Si está completamente vacunado o recientemente ha estado enfermo de COVID-19, puede que no necesite ponerse en cuarentena. Quédese en casa. Si se ha expuesto al virus nikki no tiene síntomas, es posible que deba permanecer en cuarentena hasta por 14 días. En algunos casos, podría ser [employer]. Pregúntele a german médico cuándo es seguro poner fin a la cuarentena. [address] [facility]. No vaya a la escuela, al [employer] ni a lugares públicos. Y no use el transporte público, viajes compartidos o taxis a menos que no tenga otra opción. Salga de casa solo si necesita [facility]. Nikki llame marysol al consultorio médico para que sepan que va a ir. Llame a german Ketty Valdez a german médico u otro profesional de la sam para informarles de que ha [employer]. Es posible que le indiquen que se nida mk prueba de detección o que tengan otras instrucciones para usted. Póngase mk mascarilla cuando esté cerca de otras personas. [place] puede ayudar a detener la propagación del virus. Limite el contacto con las personas en german hogar. Si es posible, quédese en mk habitación separada y use un baño separado. Evite el contacto con mascotas y [place]. Cúbrase la boca y la nariz con un pañuelo de papel cuando tosa o estornude. Luego tire el pañuelo de papel a la basura de inmediato. Lávese las juan con frecuencia, especialmente después de toser o estornudar. Use agua y [place], y fróteselas radha al menos 20 segundos. Si no tiene agua y jabón disponibles, use un desinfectante para juan a base de alcohol. No comparta artículos personales del hogar. Estos incluyen ropa de cama, toallas, tazas y vasos, y utensilios para comer. Limpie y desinfecte german hogar todos los días.   Use limpiadores domésticos o toallitas o aerosoles desinfectantes. Revisado: [address], 2021               Versión del contenido: 13.0  © 8868-2058 [employer]wise, HW. Las instrucciones de cuidado fueron adaptadas bajo licencia por [facility] CARE ([facility]). Si usted tiene [place] afección médica o sobre estas instrucciones, siempre pregunte a german profesional de sam. Topio, HW niega toda garantía o responsabilidad por german uso de esta información.

## 2021-10-12 NOTE — TELEPHONE ENCOUNTER
Patient called and appnt made. Will delay to November at this time patient is not feeling well covid positive at this time.

## 2021-10-20 ENCOUNTER — TELEPHONE (OUTPATIENT)
Dept: FAMILY MEDICINE CLINIC | Age: 51
End: 2021-10-20

## 2021-10-20 ENCOUNTER — NURSE ONLY (OUTPATIENT)
Dept: FAMILY MEDICINE CLINIC | Age: 51
End: 2021-10-20

## 2021-10-20 DIAGNOSIS — Z20.822 CLOSE EXPOSURE TO COVID-19 VIRUS: Primary | ICD-10-CM

## 2021-10-20 LAB
Lab: NORMAL
PERFORMING INSTRUMENT: NORMAL
QC PASS/FAIL: NORMAL
SARS-COV-2, POC: NORMAL

## 2021-10-20 PROCEDURE — 87426 SARSCOV CORONAVIRUS AG IA: CPT | Performed by: INTERNAL MEDICINE

## 2022-05-26 ENCOUNTER — OFFICE VISIT (OUTPATIENT)
Dept: FAMILY MEDICINE CLINIC | Age: 52
End: 2022-05-26
Payer: COMMERCIAL

## 2022-05-26 VITALS
OXYGEN SATURATION: 98 % | WEIGHT: 191 LBS | SYSTOLIC BLOOD PRESSURE: 164 MMHG | HEART RATE: 83 BPM | BODY MASS INDEX: 29.04 KG/M2 | TEMPERATURE: 98.2 F | DIASTOLIC BLOOD PRESSURE: 97 MMHG

## 2022-05-26 DIAGNOSIS — E55.9 VITAMIN D DEFICIENCY: ICD-10-CM

## 2022-05-26 DIAGNOSIS — R73.9 HYPERGLYCEMIA: ICD-10-CM

## 2022-05-26 DIAGNOSIS — I10 PRIMARY HYPERTENSION: Primary | ICD-10-CM

## 2022-05-26 PROCEDURE — 99213 OFFICE O/P EST LOW 20 MIN: CPT | Performed by: INTERNAL MEDICINE

## 2022-05-26 RX ORDER — MULTIVIT-MIN/IRON/FOLIC ACID/K 18-600-40
CAPSULE ORAL DAILY
COMMUNITY

## 2022-05-26 RX ORDER — METOPROLOL SUCCINATE 25 MG/1
25 TABLET, EXTENDED RELEASE ORAL DAILY
Qty: 30 TABLET | Refills: 0 | Status: SHIPPED | OUTPATIENT
Start: 2022-05-26 | End: 2022-08-31 | Stop reason: DRUGHIGH

## 2022-05-26 SDOH — ECONOMIC STABILITY: FOOD INSECURITY: WITHIN THE PAST 12 MONTHS, THE FOOD YOU BOUGHT JUST DIDN'T LAST AND YOU DIDN'T HAVE MONEY TO GET MORE.: NEVER TRUE

## 2022-05-26 SDOH — ECONOMIC STABILITY: FOOD INSECURITY: WITHIN THE PAST 12 MONTHS, YOU WORRIED THAT YOUR FOOD WOULD RUN OUT BEFORE YOU GOT MONEY TO BUY MORE.: NEVER TRUE

## 2022-05-26 ASSESSMENT — PATIENT HEALTH QUESTIONNAIRE - PHQ9
SUM OF ALL RESPONSES TO PHQ QUESTIONS 1-9: 0
SUM OF ALL RESPONSES TO PHQ QUESTIONS 1-9: 0
SUM OF ALL RESPONSES TO PHQ9 QUESTIONS 1 & 2: 0
1. LITTLE INTEREST OR PLEASURE IN DOING THINGS: 0
SUM OF ALL RESPONSES TO PHQ QUESTIONS 1-9: 0
SUM OF ALL RESPONSES TO PHQ QUESTIONS 1-9: 0
2. FEELING DOWN, DEPRESSED OR HOPELESS: 0

## 2022-05-26 ASSESSMENT — SOCIAL DETERMINANTS OF HEALTH (SDOH): HOW HARD IS IT FOR YOU TO PAY FOR THE VERY BASICS LIKE FOOD, HOUSING, MEDICAL CARE, AND HEATING?: NOT HARD AT ALL

## 2022-05-26 ASSESSMENT — ENCOUNTER SYMPTOMS
BACK PAIN: 0
EYE PAIN: 0
SHORTNESS OF BREATH: 0
ABDOMINAL PAIN: 0

## 2022-05-26 NOTE — PROGRESS NOTES
Subjective:      Patient ID: Sonia Ricci is a 45 y.o. female who presents today with:  Chief Complaint   Patient presents with    Follow-up     pt is here for BP f/u        HPI     Here for follow up     Past Medical History:   Diagnosis Date    Allergies     Cancer ([address]     colon    Chronic back pain     Fibroid     GERD (gastroesophageal reflux disease)     HPV in female     Hypertension     Neck pain     Osteoarthritis     Sleep apnea     Tachycardia      Past Surgical History:   Procedure Laterality Date    BREAST BIOPSY Right     CARPAL TUNNEL RELEASE Right 8/13/2021    RIGHT CARPAL TUNNEL RELEASE performed by Bhupinder Winter MD at [address], TOTAL ABDOMINAL       Social History     Socioeconomic History    Marital status:      Spouse name: Not on file    Number of children: Not on file    Years of education: Not on file    Highest education level: Not on file   Occupational History    Not on file   Tobacco Use    Smoking status: Never Smoker    Smokeless tobacco: Never Used   Vaping Use    Vaping Use: Never used   Substance and Sexual Activity    Alcohol use: No    Drug use: No    Sexual activity: Yes     Partners: Male     Comment:     Other Topics Concern    Not on file   Social History Narrative    Not on file     Social Determinants of Health     Financial Resource Strain: Low Risk     Difficulty of Paying Living Expenses: Not hard at all   Food Insecurity: No Food Insecurity    Worried About [address] [employer] in the Last Year: Never true    [address] in the Last Year: Never true   Transportation Needs:     Lack of Transportation (Medical): Not on file    Lack of Transportation (Non-Medical):  Not on file   Physical Activity:     Days of Exercise per Week: Not on file    Minutes of Exercise per Session: Not on file   Stress:     Feeling of Stress : Not on file   Social Connections:     Frequency of Communication with Friends and Family: Not on file    Frequency of Social Gatherings with Friends and Family: Not on file    Attends Oriental orthodox Services: Not on file    Active Member of Clubs or Organizations: Not on file    Attends Club or Organization Meetings: Not on file    Marital Status: Not on file   Intimate Partner Violence:     Fear of Current or Ex-Partner: Not on file    Emotionally Abused: Not on file    Physically Abused: Not on file    Sexually Abused: Not on file   Housing Stability:     Unable to Pay for Housing in the Last Year: Not on file    Number of [place] in the Last Year: Not on file    Unstable Housing in the Last Year: Not on file     Allergies   Allergen Reactions    Lisinopril      Cough     Morphine Other (See Comments)     heart races    Tramadol Other (See Comments)     dizziness, lightheaded, nausea     Current Outpatient Medications on File Prior to Visit   Medication Sig Dispense Refill    Cholecalciferol (VITAMIN D) 50 MCG (2000 UT) CAPS capsule Take by mouth daily One capsule po once daily      losartan (COZAAR) 100 MG tablet Take 1 tablet by mouth once daily 90 tablet 3    aspirin 81 MG chewable tablet Take 81 mg by mouth daily  (Patient not taking: Reported on 5/26/2022)       No current facility-administered medications on file prior to visit. I have personally reviewed the ROS, PMH, PFH, and social history     Review of Systems   Constitutional: Negative for chills and fever. HENT: Negative for congestion. Eyes: Negative for pain. Respiratory: Negative for shortness of breath. Cardiovascular: Negative for chest pain. Gastrointestinal: Negative for abdominal pain. Genitourinary: Negative for hematuria. Musculoskeletal: Negative for back pain. Allergic/Immunologic: Negative for immunocompromised state. Neurological: Negative for headaches. Psychiatric/Behavioral: Negative for hallucinations.        Objective:   BP (!) 164/97   Pulse 83   Temp 98.2 °F (36.8 °C) (Temporal)   Wt 191 lb (86.6 kg)   SpO2 98%   BMI 29.04 kg/m²     Physical Exam  Constitutional:       General: She is not in acute distress. Appearance: Normal appearance. She is not ill-appearing, toxic-appearing or diaphoretic. HENT:      Head: Normocephalic. Neck:      Vascular: No carotid bruit. Cardiovascular:      Rate and Rhythm: Normal rate and regular rhythm. Pulses: Normal pulses. Heart sounds: Normal heart sounds. No murmur heard. No friction rub. No gallop. Pulmonary:      Effort: Pulmonary effort is normal. No respiratory distress. Breath sounds: Normal breath sounds. No wheezing, rhonchi or rales. Abdominal:      General: Abdomen is flat. There is no distension. Palpations: Abdomen is soft. Tenderness: There is no abdominal tenderness. There is no right CVA tenderness, left CVA tenderness, guarding or rebound. Musculoskeletal:      Cervical back: Neck supple. Right lower leg: No edema. Left lower leg: No edema. Skin:     General: Skin is warm. Findings: No erythema or rash. Neurological:      Mental Status: She is alert. Psychiatric:         Mood and Affect: Mood normal.           Assessment:       Diagnosis Orders   1. Primary hypertension  Lipid, Fasting    TSH with Reflex    Hemoglobin A1C    Vitamin D 25 Hydroxy   2. Vitamin D deficiency  Lipid, Fasting    TSH with Reflex    Hemoglobin A1C    Vitamin D 25 Hydroxy   3.  Hyperglycemia  Lipid, Fasting    TSH with Reflex    Hemoglobin A1C    Vitamin D 25 Hydroxy         Plan:     vc.  Keep fu with cardiology  Was referred to neuro)   Restart metoprolol, take lisinopril every day please   non compliant with cpap risks explained ;.              Orders Placed This Encounter   Procedures    Lipid, Fasting     Standing Status:   Future     Standing Expiration Date:   5/26/2023    TSH with Reflex     Standing Status:   Future     Standing Expiration Date:   5/26/2023    Hemoglobin A1C     Standing Status:   Future     Standing Expiration Date:   5/26/2023    Vitamin D 25 Hydroxy     Standing Status:   Future     Standing Expiration Date:   5/26/2023     Orders Placed This Encounter   Medications    metoprolol succinate (TOPROL XL) 25 MG extended release tablet     Sig: Take 1 tablet by mouth daily     Dispense:  30 tablet     Refill:  0   Refused covid vaccines   If you get cp, ha, vision changes, dyspnea, arm pain, etc go to ER. (Risk of uncontrolled HTN)   So out of the last week has only taken losartan maybe half of the time  Take every day  She knows to not stop taking medications when she is feeling better. MA visit in 5 to 7 days for bp check. Call in 2 to 3 days if you don't hear from me after the bp check. If anything should change or worsen call ASAP, don't wait for next scheduled appointment. Return in about 3 months (around 8/26/2022) for Chronic condition management/appointment, worsening symptoms, call ASAP for appointment.       Pati Swain MD

## 2022-07-14 RX ORDER — LOSARTAN POTASSIUM 100 MG/1
100 TABLET ORAL DAILY
Qty: 90 TABLET | Refills: 3 | Status: SHIPPED | OUTPATIENT
Start: 2022-07-14

## 2022-08-31 ENCOUNTER — OFFICE VISIT (OUTPATIENT)
Dept: FAMILY MEDICINE CLINIC | Age: 52
End: 2022-08-31
Payer: COMMERCIAL

## 2022-08-31 ENCOUNTER — NURSE ONLY (OUTPATIENT)
Dept: FAMILY MEDICINE CLINIC | Age: 52
End: 2022-08-31

## 2022-08-31 ENCOUNTER — TELEPHONE (OUTPATIENT)
Dept: FAMILY MEDICINE CLINIC | Age: 52
End: 2022-08-31

## 2022-08-31 VITALS — SYSTOLIC BLOOD PRESSURE: 150 MMHG | DIASTOLIC BLOOD PRESSURE: 82 MMHG

## 2022-08-31 DIAGNOSIS — G47.30 SLEEP APNEA, UNSPECIFIED TYPE: ICD-10-CM

## 2022-08-31 DIAGNOSIS — I10 PRIMARY HYPERTENSION: ICD-10-CM

## 2022-08-31 DIAGNOSIS — I10 PRIMARY HYPERTENSION: Primary | ICD-10-CM

## 2022-08-31 DIAGNOSIS — M54.50 LOW BACK PAIN, UNSPECIFIED BACK PAIN LATERALITY, UNSPECIFIED CHRONICITY, UNSPECIFIED WHETHER SCIATICA PRESENT: ICD-10-CM

## 2022-08-31 DIAGNOSIS — E55.9 VITAMIN D DEFICIENCY: ICD-10-CM

## 2022-08-31 DIAGNOSIS — Z12.31 ENCOUNTER FOR SCREENING MAMMOGRAM FOR MALIGNANT NEOPLASM OF BREAST: ICD-10-CM

## 2022-08-31 DIAGNOSIS — M54.2 NECK PAIN: Primary | ICD-10-CM

## 2022-08-31 PROCEDURE — 99214 OFFICE O/P EST MOD 30 MIN: CPT | Performed by: INTERNAL MEDICINE

## 2022-08-31 RX ORDER — METOPROLOL SUCCINATE 50 MG/1
50 TABLET, EXTENDED RELEASE ORAL DAILY
Qty: 30 TABLET | Refills: 0 | Status: SHIPPED | OUTPATIENT
Start: 2022-08-31 | End: 2022-09-28

## 2022-08-31 RX ORDER — TIZANIDINE 2 MG/1
2 TABLET ORAL NIGHTLY PRN
Qty: 30 TABLET | Refills: 0 | Status: SHIPPED | OUTPATIENT
Start: 2022-08-31 | End: 2022-09-28

## 2022-08-31 ASSESSMENT — PATIENT HEALTH QUESTIONNAIRE - PHQ9
1. LITTLE INTEREST OR PLEASURE IN DOING THINGS: 0
SUM OF ALL RESPONSES TO PHQ QUESTIONS 1-9: 0
2. FEELING DOWN, DEPRESSED OR HOPELESS: 0
SUM OF ALL RESPONSES TO PHQ9 QUESTIONS 1 & 2: 0

## 2022-08-31 ASSESSMENT — ENCOUNTER SYMPTOMS
ABDOMINAL PAIN: 0
EYE PAIN: 0
BACK PAIN: 0
SHORTNESS OF BREATH: 0

## 2022-08-31 NOTE — TELEPHONE ENCOUNTER
Patient was called after completing VV on 8/31/22 and informed of PCP request for her medical records from [facility]. She was made aware that she needed to stop in office to complete/sign a medical release form to request the needed records. She stated she would stop in office on 9/1/22 to complete.   (FYI/Documentation purposes)

## 2022-08-31 NOTE — TELEPHONE ENCOUNTER
Pt was here today for BP check per Dr. Linda Oneill. BP was taken twice.   1st readin/82  2nd readin/82

## 2022-08-31 NOTE — PROGRESS NOTES
Subjective:      Patient ID: Shira Arndt is a 45 y.o. female who presents today with:  No chief complaint on file.       HPI      Here for follow up   Past Medical History:   Diagnosis Date    Allergies     Cancer ([address]     colon    Chronic back pain     Fibroid     GERD (gastroesophageal reflux disease)     HPV in female     Hypertension     Neck pain     Osteoarthritis     Sleep apnea     Tachycardia      Past Surgical History:   Procedure Laterality Date    BREAST BIOPSY Right     CARPAL TUNNEL RELEASE Right 8/13/2021    RIGHT CARPAL TUNNEL RELEASE performed by Medhat Shannon MD at [address] ABDOMINAL HYSTERECTOMY       Social History     Socioeconomic History    Marital status:      Spouse name: Not on file    Number of children: Not on file    Years of education: Not on file    Highest education level: Not on file   Occupational History    Not on file   Tobacco Use    Smoking status: Never    Smokeless tobacco: Never   Vaping Use    Vaping Use: Never used   Substance and Sexual Activity    Alcohol use: No    Drug use: No    Sexual activity: Yes     Partners: Male     Comment:     Other Topics Concern    Not on file   Social History Narrative    Not on file     Social Determinants of Health     Financial Resource Strain: Low Risk     Difficulty of Paying Living Expenses: Not hard at all   Food Insecurity: No Food Insecurity    Worried About Running Out of Food in the Last Year: Never true    Ran Out of Food in the Last Year: Never true   Transportation Needs: Not on file   Physical Activity: Not on file   Stress: Not on file   Social Connections: Not on file   Intimate Partner Violence: Not on file   Housing Stability: Not on file     Allergies   Allergen Reactions    Lisinopril      Cough     Morphine Other (See Comments)     heart races    Tramadol Other (See Comments)     dizziness, lightheaded, nausea     Current Outpatient Medications on File Prior to Visit   Medication Sig Dispense Refill    losartan (COZAAR) 100 MG tablet Take 1 tablet by mouth daily 90 tablet 3    metoprolol succinate (TOPROL XL) 25 MG extended release tablet Take 1 tablet by mouth daily 30 tablet 0    Cholecalciferol (VITAMIN D) 50 MCG (2000 UT) CAPS capsule Take by mouth daily One capsule po once daily       No current facility-administered medications on file prior to visit. I have personally reviewed the ROS, PMH, PFH, and social history     Review of Systems   Constitutional:  Negative for chills and fever. HENT:  Negative for congestion. Eyes:  Negative for pain. Respiratory:  Negative for shortness of breath. Cardiovascular:  Negative for chest pain. Gastrointestinal:  Negative for abdominal pain. Genitourinary:  Negative for hematuria. Musculoskeletal:  Negative for back pain. Allergic/Immunologic: Negative for immunocompromised state. Neurological:  Negative for headaches. Psychiatric/Behavioral:  Negative for hallucinations. Objective: There were no vitals taken for this visit. Physical Exam  Constitutional:       General: She is not in acute distress. Appearance: She is not ill-appearing, toxic-appearing or diaphoretic. Pulmonary:      Effort: Pulmonary effort is normal. No respiratory distress. Assessment:       Diagnosis Orders   1. Neck pain  Lipid Panel    CBC with Auto Differential    C-Reactive Protein    Sedimentation Rate    Vitamin D 25 Hydroxy    TSH with Reflex    XR CERVICAL SPINE (4-5 VIEWS)      2. Primary hypertension  Lipid Panel    CBC with Auto Differential    C-Reactive Protein    Sedimentation Rate    Vitamin D 25 Hydroxy    TSH with Reflex      3. Vitamin D deficiency  Lipid Panel    CBC with Auto Differential    C-Reactive Protein    Sedimentation Rate    Vitamin D 25 Hydroxy    TSH with Reflex      4.  Low back pain, unspecified back pain laterality, unspecified chronicity, unspecified whether sciatica present  XR THORACIC SPINE (3 VIEWS)    XR LUMBAR SPINE (MIN 4 VIEWS)    Urinalysis with Reflex to Culture      5. Sleep apnea, unspecified type  Home Sleep Study            Plan:    VIDEO  visit done because of coronavirus pandemic. Doxy. me used  explained billeable visit, patient understands and agrees to continue  I was at home and patient was at home during this visit. Keep fu with cardiology  See orders  Bp she will come in today for bp check. May need PT, await xrays   Addendum gjd increase metoprolol succinate to 50 mg once daily 8/31/2022                       Orders Placed This Encounter   Procedures    XR CERVICAL SPINE (4-5 VIEWS)     Standing Status:   Future     Standing Expiration Date:   8/31/2023    XR THORACIC SPINE (3 VIEWS)     Standing Status:   Future     Standing Expiration Date:   8/31/2023    XR LUMBAR SPINE (MIN 4 VIEWS)     Standing Status:   Future     Standing Expiration Date:   8/31/2023    Lipid Panel     Standing Status:   Future     Standing Expiration Date:   8/31/2023     Order Specific Question:   Is Patient Fasting?/# of Hours     Answer:   10    CBC with Auto Differential     Standing Status:   Future     Standing Expiration Date:   8/31/2023    C-Reactive Protein     Standing Status:   Future     Standing Expiration Date:   8/31/2023    Sedimentation Rate     Standing Status:   Future     Standing Expiration Date:   8/31/2023    Vitamin D 25 Hydroxy     Standing Status:   Future     Standing Expiration Date:   8/31/2023    TSH with Reflex     Standing Status:   Future     Standing Expiration Date:   8/31/2023    Urinalysis with Reflex to Culture     Standing Status:   Future     Standing Expiration Date:   8/31/2023     Order Specific Question:   SPECIFY(EX-CATH,MIDSTREAM,CYSTO,ETC)?      Answer:   MID STREAM    Home Sleep Study     Standing Status:   Future     Standing Expiration Date:   11/30/2022     Order Specific Question:   Location For Sleep Study Answer:   Yamilka Nieto Specific Question:   Select Sleep Lab Location     Answer:   [facility]     Orders Placed This Encounter   Medications    tiZANidine (ZANAFLEX) 2 MG tablet     Sig: Take 1 tablet by mouth nightly as needed (NECK PAIN OR  SPASMS (HOLD FOR SEDATION))     Dispense:  30 tablet     Refill:  0   Red flags for headaches explained (worse with valsalva, fevers, chills, waking you up at night, vision changes, mental status changes, pain over temples, pain with chewing etc. )   Call 911 should these occur. NO Si/hi  Red flags ha explained to er if they occur   If anything should change or worsen call ASAP, don't wait for next scheduled appointment. Return in about 6 weeks (around 10/12/2022) for Chronic condition management/appointment, regularly scheduled appointment with PCP.       Dortha Leyden, MD

## 2022-09-01 NOTE — TELEPHONE ENCOUNTER
Spoke with patient at approx 6 pm   Documented now  Increased metoprolol succinate to 50 mg once daily  Her pulse is 69 checked at home  Thanks,    Call immediately should something change.      Bong Tobin      She knows to come in a week otherwise for bp and hr check

## 2022-09-28 RX ORDER — TIZANIDINE 2 MG/1
2 TABLET ORAL NIGHTLY PRN
Qty: 30 TABLET | Refills: 0 | Status: SHIPPED | OUTPATIENT
Start: 2022-09-28

## 2022-09-28 RX ORDER — METOPROLOL SUCCINATE 50 MG/1
TABLET, EXTENDED RELEASE ORAL
Qty: 30 TABLET | Refills: 0 | Status: SHIPPED | OUTPATIENT
Start: 2022-09-28

## 2023-06-23 NOTE — PROGRESS NOTES
Physical Therapy Visit    Visit Type: Daily Treatment Note  Visit: 2  Referring Provider: Moise Hernandez MD  Medical Diagnosis (from order): Diagnosis Information    Diagnosis  716.95 (ICD-9-CM) - M16.12 (ICD-10-CM) - Arthritis of left hip  733.99 (ICD-9-CM) - M85.89 (ICD-10-CM) - Other specified disorders of bone density and structure, multiple sites  729.1 (ICD-9-CM) - M79.7 (ICD-10-CM) - Fibromyalgia         SUBJECTIVE                                                                                                               Patient states that the hip feels a little better with exercise. \"It still hurts, but not as bad as it was.\" She reports that she has been doing her home exercise program. She does not believe that she has been very active, and she is motivated to get more fit.       OBJECTIVE                                                                                                                                       Treatment     Therapeutic Exercise  Recumbent Bike at seat level 7, resistance level 2.0, for 7 minutes - subjective completed   *Supine Bridge with non-latex green band, 2 x 10 - first set completed without band   *Clamshell with non-latex green band x 10   *Sitting Knee Extension, 2 x 10   NEW Standing hip abduction, bilateral x 10 - verbal cue to avoid opening up hips  NEW Standing hip extension, bilateral x 10     Neuromuscular Re-Education  Three-point star, bilateral x 10   Standing march at railing, 2 x 2 minutes   Sidestepping along long foam pad, bilateral x 5 lengths     Educated on components of balance, impact of strength to maintain upright balance     Skilled input: verbal instruction/cues, tactile instruction/cues and demonstration  education/instruction on: positioning with exercise, instruction in new exercises,  provided written home program update and reviewed, answered patient questions  instruction/cues for: HEP    Writer verbally educated and received verbal  Subjective:      Patient ID: Presley Morfin is a 47 y.o. female who presents today with:  No chief complaint on file. HPI    Allergies-Night time coughing some as well, mentions for a month or so. Denies fevers, chills, she was tested and negative for covid. otc claritin and zrytec not helping much. Also watery eyes and drainage from her nose. Is worse when she is outside. Hypertension-Chronic, essential. Not compliant with low sodium diet. Known obesity. Male Sex. Compliant with therapy.  Improved with   Past Medical History:   Diagnosis Date    Cancer ([facility] [address]     colon    Chronic back pain     GERD (gastroesophageal reflux disease)     Hypertension     Neck pain     Osteoarthritis     Sleep apnea      Past Surgical History:   Procedure Laterality Date    COLONOSCOPY      HYSTERECTOMY, TOTAL ABDOMINAL       Social History     Socioeconomic History    Marital status:      Spouse name: Not on file    Number of children: Not on file    Years of education: Not on file    Highest education level: Not on file   Occupational History    Not on file   Social Needs    Financial resource strain: Not on file    Food insecurity     Worry: Not on file     Inability: Not on file    Transportation needs     Medical: Not on file     Non-medical: Not on file   Tobacco Use    Smoking status: Never Smoker    Smokeless tobacco: Never Used   Substance and Sexual Activity    Alcohol use: No    Drug use: No    Sexual activity: Yes     Partners: Male     Comment:     Lifestyle    Physical activity     Days per week: Not on file     Minutes per session: Not on file    Stress: Not on file   Relationships    Social connections     Talks on phone: Not on file     Gets together: Not on file     Attends Worship service: Not on file     Active member of club or organization: Not on file     Attends meetings of clubs or organizations: Not on file     Relationship status: Not on file   [address] [place] [address] Intimate partner violence     Fear of current or ex partner: Not on file     Emotionally abused: Not on file     Physically abused: Not on file     Forced sexual activity: Not on file   Other Topics Concern    Not on file   Social History Narrative    Not on file     Allergies   Allergen Reactions    Morphine Other (See Comments)     heart races    Tramadol Other (See Comments)     dizziness, lightheaded, nausea     Current Outpatient Medications on File Prior to Visit   Medication Sig Dispense Refill    lisinopril (PRINIVIL;ZESTRIL) 5 MG tablet Take 1 tablet by mouth daily 90 tablet 0    baclofen (LIORESAL) 10 MG tablet Take one tablet po bid prn muscle pain or spasms 60 tablet 2    fluticasone (FLONASE) 50 MCG/ACT nasal spray 1 spray by Nasal route daily 1 Bottle 0    magnesium (MAGNESIUM-OXIDE) 250 MG TABS tablet Take 250 mg by mouth daily      b complex vitamins capsule Take 1 capsule by mouth daily      nabumetone (RELAFEN) 500 MG tablet Take 1 tablet by mouth 2 times daily as needed for Pain 60 tablet 1     No current facility-administered medications on file prior to visit. I have personally reviewed the ROS, PMH, PFH, and social history     Review of Systems   Constitutional: Negative for chills and fever. HENT: Positive for sneezing. Negative for congestion, ear pain and sore throat. Watery eyes    Eyes: Negative for pain. Respiratory: Negative for shortness of breath. Cardiovascular: Negative for chest pain. Gastrointestinal: Negative for abdominal pain. Genitourinary: Negative for hematuria. Musculoskeletal: Negative for back pain. Allergic/Immunologic: Negative for immunocompromised state. Neurological: Negative for headaches. Psychiatric/Behavioral: Negative for hallucinations. Objective: There were no vitals taken for this visit. Physical Exam    Unable to perform given telephone visit. Assessment:       Diagnosis Orders   1.  Allergic rhinitis, consent for hand placement, positioning of patient, and techniques to be performed today from patient for clothing adjustments for techniques, therapist position for techniques and hand placement and palpation for techniques as described above and how they are pertinent to the patient's plan of care.  Home Exercise Program  Access Code: [email or web address]  URL: [email or web address]/  Date: 06/20/2023  Prepared by: Gil Álvarez    Exercises  - Supine Bridge with Resistance Band  - 1 x daily - 7 x weekly - 3 sets - 15 reps  - Clamshell with Resistance  - 2 x daily - 7 x weekly - 3 sets - 15 reps  - Sitting Knee Extension with Resistance  - 1 x daily - 7 x weekly - 3 sets - 15 reps      ASSESSMENT                                                                                                            Patient was able to progress to weightbearing hip strengthening on this date, showing progression in functional workload capacity. She was able to increase resistance with exercises in home exercise program, requiring minimal verbal cueing, demonstrating carryover from previous session. She was challenged with some balance on this date. Patient appears motivated to return to prior level of function and taking the initiative to work on balance and strengthening to return to walking and biking.   Education:   - Results of above outlined education: Verbalizes understanding and Needs reinforcement    PLAN                                                                                                                           Suggestions for next session as indicated: Progress per plan of care  Leg press, lunge  Hip/core strength - balance, quadruped, exercise ball       Therapy procedure time and total treatment time can be found documented on the Time Entry flowsheet     unspecified seasonality, unspecified trigger     2. Vitamin D deficiency  vitamin D (ERGOCALCIFEROL) 1.25 MG (28224 UT) CAPS capsule   3. Essential hypertension     4. Cough  XR CHEST STANDARD (2 VW)   5. Chronic low back pain, unspecified back pain laterality, unspecified whether sciatica present  [facility]) - Umesh Ruth MD, [facility] [place]         Plan:    Telephone visit done because of coronavirus pandemic. Time spent 21 minutes   explained billeable visit, patient understands and agrees to continue  I was at home and patient was at home during this visit. Try allegra  Stop otc products  If not better come into office and call  Should it worsen call asap   Start on high dose vitamind   She mentions bp is about 120/70.   10 year risk of ascvd is 1%  Waiting on mammogram   Needs sleep study scheduled, please help her to get this done. REDUCE amlodipine to 5 mg once daily  She is not taking every day, she's concerned it's low  MA visit in 5 to 7 days for bp check. Call in 2 to 3 days if you don't hear from me after the bp check. Follow up with Dr. Tommie Tate as directed given history of colon cancer  cxr if not better  She denies s/s of infection  Follow up with Dr. Tommie Tate as directed given history of colon cancer  emg done, please follow up with pain management  F/u In 6 weeks. If anything should change or worsen call ASAP, don't wait for next scheduled appointment.     Orders Placed This Encounter   Procedures    XR CHEST STANDARD (2 VW)     Standing Status:   Future     Standing Expiration Date:   7/2/2021    [facility] - Umesh Ruth MD, [facility] [place]     Referral Priority:   Routine     Referral Type:   Eval and Treat     Referral Reason:   Specialty Services Required     Referred to Provider:   Erin Medrano MD     Requested Specialty:   Physical Medicine and Rehab     Number of Visits Requested:   1     Orders Placed This Encounter   Medications    fexofenadine (ALLEGRA) 180 MG tablet     Sig: Take 1 tablet by mouth daily     Dispense:  30 tablet     Refill:  0    vitamin D (ERGOCALCIFEROL) 1.25 MG (10639 UT) CAPS capsule     Sig: Take 1 capsule by mouth once a week     Dispense:  6 capsule     Refill:  0    amLODIPine (NORVASC) 5 MG tablet     Sig: Take 1 tablet by mouth daily     Dispense:  30 tablet     Refill:  0       Return in about 6 weeks (around 8/13/2020) for Chronic condition management/appointment, worsening symptoms, call ASAP for appointment.       Corrie Quiñones MD

## 2025-03-21 ENCOUNTER — HOSPITAL ENCOUNTER (EMERGENCY)
Facility: HOSPITAL | Age: 55
Discharge: HOME | End: 2025-03-21
Payer: COMMERCIAL

## 2025-03-21 ENCOUNTER — APPOINTMENT (OUTPATIENT)
Dept: RADIOLOGY | Facility: HOSPITAL | Age: 55
End: 2025-03-21
Payer: COMMERCIAL

## 2025-03-21 ENCOUNTER — APPOINTMENT (OUTPATIENT)
Dept: CARDIOLOGY | Facility: HOSPITAL | Age: 55
End: 2025-03-21
Payer: COMMERCIAL

## 2025-03-21 VITALS
HEART RATE: 64 BPM | BODY MASS INDEX: 28.79 KG/M2 | SYSTOLIC BLOOD PRESSURE: 179 MMHG | HEIGHT: 68 IN | RESPIRATION RATE: 18 BRPM | OXYGEN SATURATION: 98 % | TEMPERATURE: 97.7 F | WEIGHT: 190 LBS | DIASTOLIC BLOOD PRESSURE: 76 MMHG

## 2025-03-21 DIAGNOSIS — N20.1 LEFT URETERAL CALCULUS: Primary | ICD-10-CM

## 2025-03-21 LAB
ALBUMIN SERPL BCP-MCNC: 4.3 G/DL (ref 3.4–5)
ALP SERPL-CCNC: 87 U/L (ref 33–110)
ALT SERPL W P-5'-P-CCNC: 26 U/L (ref 7–45)
AMORPH CRY #/AREA UR COMP ASSIST: ABNORMAL /HPF
ANION GAP SERPL CALC-SCNC: 12 MMOL/L (ref 10–20)
APPEARANCE UR: ABNORMAL
AST SERPL W P-5'-P-CCNC: 23 U/L (ref 9–39)
ATRIAL RATE: 60 BPM
BACTERIA #/AREA URNS AUTO: ABNORMAL /HPF
BASOPHILS # BLD AUTO: 0.02 X10*3/UL (ref 0–0.1)
BASOPHILS NFR BLD AUTO: 0.2 %
BILIRUB SERPL-MCNC: 1 MG/DL (ref 0–1.2)
BILIRUB UR STRIP.AUTO-MCNC: NEGATIVE MG/DL
BUN SERPL-MCNC: 15 MG/DL (ref 6–23)
CALCIUM SERPL-MCNC: 9.5 MG/DL (ref 8.6–10.3)
CARDIAC TROPONIN I PNL SERPL HS: 10 NG/L (ref 0–13)
CARDIAC TROPONIN I PNL SERPL HS: 11 NG/L (ref 0–13)
CHLORIDE SERPL-SCNC: 104 MMOL/L (ref 98–107)
CO2 SERPL-SCNC: 25 MMOL/L (ref 21–32)
COLOR UR: COLORLESS
CREAT SERPL-MCNC: 0.67 MG/DL (ref 0.5–1.05)
EGFRCR SERPLBLD CKD-EPI 2021: >90 ML/MIN/1.73M*2
EOSINOPHIL # BLD AUTO: 0.07 X10*3/UL (ref 0–0.7)
EOSINOPHIL NFR BLD AUTO: 0.8 %
ERYTHROCYTE [DISTWIDTH] IN BLOOD BY AUTOMATED COUNT: 13.2 % (ref 11.5–14.5)
GLUCOSE SERPL-MCNC: 111 MG/DL (ref 74–99)
GLUCOSE UR STRIP.AUTO-MCNC: NORMAL MG/DL
HCT VFR BLD AUTO: 38.4 % (ref 36–46)
HGB BLD-MCNC: 13 G/DL (ref 12–16)
IMM GRANULOCYTES # BLD AUTO: 0.05 X10*3/UL (ref 0–0.7)
IMM GRANULOCYTES NFR BLD AUTO: 0.6 % (ref 0–0.9)
KETONES UR STRIP.AUTO-MCNC: NEGATIVE MG/DL
LEUKOCYTE ESTERASE UR QL STRIP.AUTO: NEGATIVE
LIPASE SERPL-CCNC: 42 U/L (ref 9–82)
LYMPHOCYTES # BLD AUTO: 1.9 X10*3/UL (ref 1.2–4.8)
LYMPHOCYTES NFR BLD AUTO: 21.6 %
MCH RBC QN AUTO: 29.5 PG (ref 26–34)
MCHC RBC AUTO-ENTMCNC: 33.9 G/DL (ref 32–36)
MCV RBC AUTO: 87 FL (ref 80–100)
MONOCYTES # BLD AUTO: 0.66 X10*3/UL (ref 0.1–1)
MONOCYTES NFR BLD AUTO: 7.5 %
NEUTROPHILS # BLD AUTO: 6.08 X10*3/UL (ref 1.2–7.7)
NEUTROPHILS NFR BLD AUTO: 69.3 %
NITRITE UR QL STRIP.AUTO: NEGATIVE
NRBC BLD-RTO: 0 /100 WBCS (ref 0–0)
P AXIS: 52 DEGREES
P OFFSET: 154 MS
P ONSET: 97 MS
PH UR STRIP.AUTO: 8 [PH]
PLATELET # BLD AUTO: 240 X10*3/UL (ref 150–450)
POTASSIUM SERPL-SCNC: 3.9 MMOL/L (ref 3.5–5.3)
PR INTERVAL: 248 MS
PROT SERPL-MCNC: 7.4 G/DL (ref 6.4–8.2)
PROT UR STRIP.AUTO-MCNC: NEGATIVE MG/DL
Q ONSET: 221 MS
QRS COUNT: 10 BEATS
QRS DURATION: 78 MS
QT INTERVAL: 400 MS
QTC CALCULATION(BAZETT): 400 MS
QTC FREDERICIA: 400 MS
R AXIS: 4 DEGREES
RBC # BLD AUTO: 4.4 X10*6/UL (ref 4–5.2)
RBC # UR STRIP.AUTO: ABNORMAL MG/DL
RBC #/AREA URNS AUTO: >20 /HPF
SODIUM SERPL-SCNC: 137 MMOL/L (ref 136–145)
SP GR UR STRIP.AUTO: 1.01
SQUAMOUS #/AREA URNS AUTO: ABNORMAL /HPF
T AXIS: 12 DEGREES
T OFFSET: 421 MS
UROBILINOGEN UR STRIP.AUTO-MCNC: NORMAL MG/DL
VENTRICULAR RATE: 60 BPM
WBC # BLD AUTO: 8.8 X10*3/UL (ref 4.4–11.3)
WBC #/AREA URNS AUTO: ABNORMAL /HPF

## 2025-03-21 PROCEDURE — 36415 COLL VENOUS BLD VENIPUNCTURE: CPT | Performed by: PHYSICIAN ASSISTANT

## 2025-03-21 PROCEDURE — 2500000004 HC RX 250 GENERAL PHARMACY W/ HCPCS (ALT 636 FOR OP/ED): Performed by: PHYSICIAN ASSISTANT

## 2025-03-21 PROCEDURE — 96374 THER/PROPH/DIAG INJ IV PUSH: CPT

## 2025-03-21 PROCEDURE — 85025 COMPLETE CBC W/AUTO DIFF WBC: CPT | Performed by: PHYSICIAN ASSISTANT

## 2025-03-21 PROCEDURE — 84075 ASSAY ALKALINE PHOSPHATASE: CPT | Performed by: PHYSICIAN ASSISTANT

## 2025-03-21 PROCEDURE — 93005 ELECTROCARDIOGRAM TRACING: CPT

## 2025-03-21 PROCEDURE — 71045 X-RAY EXAM CHEST 1 VIEW: CPT | Performed by: INTERNAL MEDICINE

## 2025-03-21 PROCEDURE — 74176 CT ABD & PELVIS W/O CONTRAST: CPT

## 2025-03-21 PROCEDURE — 83690 ASSAY OF LIPASE: CPT | Performed by: PHYSICIAN ASSISTANT

## 2025-03-21 PROCEDURE — 80053 COMPREHEN METABOLIC PANEL: CPT | Performed by: PHYSICIAN ASSISTANT

## 2025-03-21 PROCEDURE — 99285 EMERGENCY DEPT VISIT HI MDM: CPT | Mod: 25

## 2025-03-21 PROCEDURE — 84484 ASSAY OF TROPONIN QUANT: CPT | Performed by: PHYSICIAN ASSISTANT

## 2025-03-21 PROCEDURE — 2500000001 HC RX 250 WO HCPCS SELF ADMINISTERED DRUGS (ALT 637 FOR MEDICARE OP): Performed by: PHYSICIAN ASSISTANT

## 2025-03-21 PROCEDURE — 71045 X-RAY EXAM CHEST 1 VIEW: CPT

## 2025-03-21 PROCEDURE — 81001 URINALYSIS AUTO W/SCOPE: CPT | Performed by: PHYSICIAN ASSISTANT

## 2025-03-21 PROCEDURE — 96375 TX/PRO/DX INJ NEW DRUG ADDON: CPT

## 2025-03-21 PROCEDURE — 74176 CT ABD & PELVIS W/O CONTRAST: CPT | Performed by: INTERNAL MEDICINE

## 2025-03-21 RX ORDER — ONDANSETRON HYDROCHLORIDE 2 MG/ML
4 INJECTION, SOLUTION INTRAVENOUS ONCE
Status: COMPLETED | OUTPATIENT
Start: 2025-03-21 | End: 2025-03-21

## 2025-03-21 RX ORDER — KETOROLAC TROMETHAMINE 10 MG/1
10 TABLET, FILM COATED ORAL EVERY 8 HOURS PRN
Qty: 9 TABLET | Refills: 0 | Status: SHIPPED | OUTPATIENT
Start: 2025-03-21 | End: 2025-03-24

## 2025-03-21 RX ORDER — KETOROLAC TROMETHAMINE 30 MG/ML
30 INJECTION, SOLUTION INTRAMUSCULAR; INTRAVENOUS ONCE
Status: COMPLETED | OUTPATIENT
Start: 2025-03-21 | End: 2025-03-21

## 2025-03-21 RX ORDER — ACETAMINOPHEN 325 MG/1
975 TABLET ORAL ONCE
Status: COMPLETED | OUTPATIENT
Start: 2025-03-21 | End: 2025-03-21

## 2025-03-21 RX ORDER — TAMSULOSIN HYDROCHLORIDE 0.4 MG/1
0.4 CAPSULE ORAL NIGHTLY
Qty: 7 CAPSULE | Refills: 0 | Status: SHIPPED | OUTPATIENT
Start: 2025-03-21 | End: 2025-03-28

## 2025-03-21 RX ADMIN — ACETAMINOPHEN 975 MG: 325 TABLET ORAL at 16:15

## 2025-03-21 RX ADMIN — KETOROLAC TROMETHAMINE 30 MG: 30 INJECTION, SOLUTION INTRAMUSCULAR at 14:48

## 2025-03-21 RX ADMIN — ONDANSETRON 4 MG: 2 INJECTION INTRAMUSCULAR; INTRAVENOUS at 14:48

## 2025-03-21 ASSESSMENT — PAIN DESCRIPTION - ORIENTATION
ORIENTATION: LEFT
ORIENTATION: LEFT

## 2025-03-21 ASSESSMENT — LIFESTYLE VARIABLES
EVER FELT BAD OR GUILTY ABOUT YOUR DRINKING: NO
HAVE YOU EVER FELT YOU SHOULD CUT DOWN ON YOUR DRINKING: NO
HAVE PEOPLE ANNOYED YOU BY CRITICIZING YOUR DRINKING: NO
TOTAL SCORE: 0
EVER HAD A DRINK FIRST THING IN THE MORNING TO STEADY YOUR NERVES TO GET RID OF A HANGOVER: NO

## 2025-03-21 ASSESSMENT — PAIN SCALES - GENERAL
PAINLEVEL_OUTOF10: 7
PAINLEVEL_OUTOF10: 10 - WORST POSSIBLE PAIN

## 2025-03-21 ASSESSMENT — PAIN DESCRIPTION - PAIN TYPE
TYPE: ACUTE PAIN
TYPE: ACUTE PAIN

## 2025-03-21 ASSESSMENT — PAIN DESCRIPTION - DESCRIPTORS: DESCRIPTORS: SHARP

## 2025-03-21 ASSESSMENT — COLUMBIA-SUICIDE SEVERITY RATING SCALE - C-SSRS
6. HAVE YOU EVER DONE ANYTHING, STARTED TO DO ANYTHING, OR PREPARED TO DO ANYTHING TO END YOUR LIFE?: NO
1. IN THE PAST MONTH, HAVE YOU WISHED YOU WERE DEAD OR WISHED YOU COULD GO TO SLEEP AND NOT WAKE UP?: NO
2. HAVE YOU ACTUALLY HAD ANY THOUGHTS OF KILLING YOURSELF?: NO

## 2025-03-21 ASSESSMENT — PAIN DESCRIPTION - LOCATION
LOCATION: ABDOMEN
LOCATION: ABDOMEN

## 2025-03-21 ASSESSMENT — PAIN - FUNCTIONAL ASSESSMENT
PAIN_FUNCTIONAL_ASSESSMENT: 0-10
PAIN_FUNCTIONAL_ASSESSMENT: 0-10

## 2025-03-21 ASSESSMENT — PAIN DESCRIPTION - PROGRESSION: CLINICAL_PROGRESSION: GRADUALLY IMPROVING

## 2025-03-21 ASSESSMENT — PAIN DESCRIPTION - FREQUENCY: FREQUENCY: CONSTANT/CONTINUOUS

## 2025-03-21 NOTE — ED PROVIDER NOTES
HPI   Chief Complaint   Patient presents with    Flank Pain     Left flank pain that started today       54-year-old female with a history of hypertension, JU, gastritis presenting to the ER today with a sharp pain in her left side that started about 1 hour before coming to the ER.  There was no fall or injury.  Patient states that she has not passed a kidney stone before and this feels similar.  It is making her feel nauseous and she has not vomited.  She states her bowel movements have been regular recently.  She has not noticed any blood in urine, urinary frequency urgency or painful urination.  She tells me she has a no concerns for pregnancy as she has a hysterectomy.  She states that hurts to take a deep breath and so it is making her feel short of breath.  She denies any pain up in her chest or having a fever.  She has not been sick with a cough.  No further complaints at this time.      History provided by:  Patient          Patient History   History reviewed. No pertinent past medical history.  History reviewed. No pertinent surgical history.  No family history on file.  Social History     Tobacco Use    Smoking status: Not on file    Smokeless tobacco: Not on file   Substance Use Topics    Alcohol use: Not on file    Drug use: Not on file       Physical Exam   ED Triage Vitals [03/21/25 1304]   Temperature Heart Rate Respirations BP   36.5 °C (97.7 °F) 67 16 (!) 199/106      Pulse Ox Temp src Heart Rate Source Patient Position   99 % -- Monitor --      BP Location FiO2 (%)     -- --       Physical Exam  Constitutional:       Comments: Appears uncomfortable   Eyes:      Conjunctiva/sclera: Conjunctivae normal.   Cardiovascular:      Rate and Rhythm: Normal rate and regular rhythm.      Pulses: Normal pulses.      Heart sounds: Normal heart sounds.   Pulmonary:      Effort: Pulmonary effort is normal.      Breath sounds: Normal breath sounds.   Abdominal:      General: Bowel sounds are normal. There is no  distension.      Palpations: Abdomen is soft.      Tenderness: There is abdominal tenderness. There is left CVA tenderness. There is no right CVA tenderness.      Comments: Tenderness throughout the left upper quadrant, left upper flank.  No further tenderness on abdominal palpation.   Musculoskeletal:      Comments: No thoracic or lumbar spinal tenderness, step-offs or signs of trauma to the thoracic or lumbar regions.  Chest wall nontender, atraumatic.  No calf tenderness or swelling bilaterally.   Skin:     General: Skin is warm.   Neurological:      Mental Status: She is alert.      Comments: Speech normal           ED Course & MDM   Diagnoses as of 03/21/25 1603   Left ureteral calculus                 No data recorded     [place] Coma Scale Score: 15 (03/21/25 1308 : Yeimy Boyd RN)                           Medical Decision Making  54-year-old female with a history of hypertension, JU, gastritis, previous kidney stones presenting to the ER today with sharp left-sided flank pain that began 1 hour before arrival without fall or injury.  She has had some nausea and hurts to take a deep breath in.  No further complaints the patient arrives afebrile hypertensive with otherwise stable vital signs.  She is tearful, crying and appears uncomfortable.  On my exam heart RRR, lungs are clear.  There is no signs of trauma the thoracic or lumbar spines or chest wall.  She is tender in the left CVA region, left upper flank and left upper quadrant of the abdomen.  Rest of abdomen is nontender nondistended with normal bowel sounds.  ECG, laboratory studies and imaging studies are ordered.  Patient tells me that she does not tolerate pain medications well and is specifically requesting Toradol for pain relief which is ordered for her.    ECG per my interpretation sinus rhythm with first-degree AV block at a rate of 60 bpm.  Nonspecific changes but no acute ST elevations or depressions.  QTc is 400.  CT abdomen pelvis  performed today shows a 3 x 2 x 3 mm calculus in the left proximal ureter with upstream hydronephrosis.  No further acute abnormalities.  Chest x-ray performed today does not show any acute cardiopulmonary process.  Patient is still pending laboratory studies, urinalysis at this time.    CBC with stable H&H and no leukocytosis.  Urinalysis today with hematuria but no evidence of infection.  Lipase is 42.  CMP with glucose of 111 but no other acute electrolyte abnormality, no renal insufficiency.  Troponin level is 10.  Labs this time are otherwise within normal limits.  Patient is pending repeat troponin.    Repeat troponin level is 11.  I have low suspicion for cardiac event at this time.  CT is showing today proximal ureteral stone.  On reassessment patient states the Toradol is helping her pain.  She tells me she does not like any sort of narcotic for pain relief.  We will give her dose of Tylenol prior to discharge.  Patient tells me she feels well enough to go home.  She does not want any sort of narcotic pain prescription so I will supply her with a short course of Toradol however I told her to use caution when using this medication as it can harm her kidneys.  She expressed understanding.  She states that she will use Tylenol and just use Toradol and the pain is severe.  She will also be discharged with Flomax and given follow-up with her primary doctor as well as urology.  I did discuss warning signs to return to the ER and she expressed understanding and agreed with the plan of care today.      Labs Reviewed   COMPREHENSIVE METABOLIC PANEL - Abnormal       Result Value    Glucose 111 (*)     Sodium 137      Potassium 3.9      Chloride 104      Bicarbonate 25      Anion Gap 12      Urea Nitrogen 15      Creatinine 0.67      eGFR >90      Calcium 9.5      Albumin 4.3      Alkaline Phosphatase 87      Total Protein 7.4      AST 23      Bilirubin, Total 1.0      ALT 26     URINALYSIS WITH REFLEX CULTURE AND  MICROSCOPIC - Abnormal    Color, Urine Colorless (*)     Appearance, Urine Turbid (*)     Specific Gravity, Urine 1.012      pH, Urine 8.0      Protein, Urine NEGATIVE      Glucose, Urine Normal      Blood, Urine 0.1 (1+) (*)     Ketones, Urine NEGATIVE      Bilirubin, Urine NEGATIVE      Urobilinogen, Urine Normal      Nitrite, Urine NEGATIVE      Leukocyte Esterase, Urine NEGATIVE     URINALYSIS MICROSCOPIC WITH REFLEX CULTURE - Abnormal    WBC, Urine 1-5      RBC, Urine >20 (*)     Squamous Epithelial Cells, Urine 1-9 (SPARSE)      Bacteria, Urine 1+ (*)     Amorphous Crystals, Urine 1+     LIPASE - Normal    Lipase 42      Narrative:     Venipuncture immediately after or during the administration of Metamizole may lead to falsely low results. Testing should be performed immediately prior to Metamizole dosing.   SERIAL TROPONIN-INITIAL - Normal    Troponin I, High Sensitivity 10      Narrative:     Less than 99th percentile of normal range cutoff-  Female and children under 18 years old <14 ng/L; Male <21 ng/L: Negative  Repeat testing should be performed if clinically indicated.     Female and children under 18 years old 14-50 ng/L; Male 21-50 ng/L:  Consistent with possible cardiac damage and possible increased clinical   risk. Serial measurements may help to assess extent of myocardial damage.     >50 ng/L: Consistent with cardiac damage, increased clinical risk and  myocardial infarction. Serial measurements may help assess extent of   myocardial damage.      NOTE: Children less than 1 year old may have higher baseline troponin   levels and results should be interpreted in conjunction with the overall   clinical context.     NOTE: Troponin I testing is performed using a different   testing methodology at [facility] than at other   [facility]. Direct result comparisons should only   be made within the same method.   SERIAL TROPONIN, 1 HOUR - Normal    Troponin I, High Sensitivity 11       Narrative:     Less than 99th percentile of normal range cutoff-  Female and children under 18 years old <14 ng/L; Male <21 ng/L: Negative  Repeat testing should be performed if clinically indicated.     Female and children under 18 years old 14-50 ng/L; Male 21-50 ng/L:  Consistent with possible cardiac damage and possible increased clinical   risk. Serial measurements may help to assess extent of myocardial damage.     >50 ng/L: Consistent with cardiac damage, increased clinical risk and  myocardial infarction. Serial measurements may help assess extent of   myocardial damage.      NOTE: Children less than 1 year old may have higher baseline troponin   levels and results should be interpreted in conjunction with the overall   clinical context.     NOTE: Troponin I testing is performed using a different   testing methodology at [facility] than at other   [facility]. Direct result comparisons should only   be made within the same method.   CBC WITH AUTO DIFFERENTIAL    WBC 8.8      nRBC 0.0      RBC 4.40      Hemoglobin 13.0      Hematocrit 38.4      MCV 87      MCH 29.5      MCHC 33.9      RDW 13.2      Platelets 240      Neutrophils % 69.3      Immature Granulocytes %, Automated 0.6      Lymphocytes % 21.6      Monocytes % 7.5      Eosinophils % 0.8      Basophils % 0.2      Neutrophils Absolute 6.08      Immature Granulocytes Absolute, Automated 0.05      Lymphocytes Absolute 1.90      Monocytes Absolute 0.66      Eosinophils Absolute 0.07      Basophils Absolute 0.02     TROPONIN SERIES- (INITIAL, 1 HR)    Narrative:     The following orders were created for panel order Troponin I Series, High Sensitivity (0, 1 HR).  Procedure                               Abnormality         Status                     ---------                               -----------         ------                     Troponin I, High Sensiti...[[id number]]  Normal              Final result               Troponin, High  Sensitivi...[[id number]]  Normal              Final result                 Please view results for these tests on the individual orders.   URINALYSIS WITH REFLEX CULTURE AND MICROSCOPIC    Narrative:     The following orders were created for panel order Urinalysis with Reflex Culture and Microscopic.  Procedure                               Abnormality         Status                     ---------                               -----------         ------                     Urinalysis with Reflex C...[[id number]]  Abnormal            Final result               Extra Urine Gray Tube[[id number]]                            In process                   Please view results for these tests on the individual orders.   EXTRA URINE GRAY TUBE       XR chest 1 view   Final Result   No evidence of acute cardiopulmonary process.        Signed by: Maria Eugenia Lomas 3/21/2025 2:36 PM   Dictation workstation:   [id number]      CT abdomen pelvis wo IV contrast   Final Result   A 3 x 2 x 3 mm calculus is seen in the left proximal ureter near the   ureteropelvic junction, with mild upstream hydronephrosis and   equivocal adjacent stranding. Additional chronic and incidental   findings as detailed above.        MACRO:   Critical Finding:  See findings. Notification was initiated on   3/21/2025 at 2:35 pm by  Maria Eugenia Lomas.  (**-YCF-**) Instructions:        Signed by: Maria Eugenia Lomas 3/21/2025 2:36 PM   Dictation workstation:   [id number]            Procedure  Procedures     Albertina Vences PA-C  03/21/25 0604

## 2025-03-22 LAB — HOLD SPECIMEN: NORMAL

## (undated) DEVICE — BANDAGE COMPR W2INXL5YD WHT BGE POLY COT M E WRP WV HK AND

## (undated) DEVICE — ZIMMER® STERILE DISPOSABLE TOURNIQUET CUFF, DUAL PORT, SINGLE BLADDER, 18 IN. (46 CM)

## (undated) DEVICE — HAND II: Brand: MEDLINE INDUSTRIES, INC.

## (undated) DEVICE — GLOVE ORANGE PI 8   MSG9080

## (undated) DEVICE — DRESSING GZ W1XL8IN COT XRFRM N ADH OVERWRAP CURAD

## (undated) DEVICE — COVER LT HNDL BLU PLAS

## (undated) DEVICE — SUTURE NONABSORBABLE MONOFILAMENT 5-0 PS-2 18 IN BLK ETHILON 1666H

## (undated) DEVICE — GOWN,AURORA,NONREINFORCED,LARGE: Brand: MEDLINE

## (undated) DEVICE — SPLINT CAST W3XL15IN GRN STRENGTH PLSTR OF PARIS FAST SET

## (undated) DEVICE — LABEL MED MINI W/ MARKER

## (undated) DEVICE — PADDING UNDERCAST W4INXL12FT RAYON POLY SYN NONADHESIVE

## (undated) DEVICE — SYRINGE IRRIG 60ML SFT PLIABLE BLB EZ TO GRP 1 HND USE W/

## (undated) DEVICE — COTTON UNDERCAST PADDING,REGULAR FINISH: Brand: WEBRIL

## (undated) DEVICE — APPLICATOR MEDICATED 26 CC SOLUTION HI LT ORNG CHLORAPREP

## (undated) DEVICE — 1010 S-DRAPE TOWEL DRAPE 10/BX: Brand: STERI-DRAPE™

## (undated) DEVICE — GAUZE,SPONGE,FLUFF,6"X6.75",STRL,10/TRAY: Brand: MEDLINE